# Patient Record
Sex: FEMALE | Race: ASIAN | NOT HISPANIC OR LATINO | URBAN - METROPOLITAN AREA
[De-identification: names, ages, dates, MRNs, and addresses within clinical notes are randomized per-mention and may not be internally consistent; named-entity substitution may affect disease eponyms.]

---

## 2017-07-17 ENCOUNTER — OFFICE VISIT - HEALTHEAST (OUTPATIENT)
Dept: FAMILY MEDICINE | Facility: CLINIC | Age: 37
End: 2017-07-17

## 2017-07-17 DIAGNOSIS — J02.9 SORE THROAT: ICD-10-CM

## 2017-07-20 ENCOUNTER — COMMUNICATION - HEALTHEAST (OUTPATIENT)
Dept: FAMILY MEDICINE | Facility: CLINIC | Age: 37
End: 2017-07-20

## 2017-07-27 ENCOUNTER — OFFICE VISIT - HEALTHEAST (OUTPATIENT)
Dept: SURGERY | Facility: CLINIC | Age: 37
End: 2017-07-27

## 2017-07-27 DIAGNOSIS — R16.0 LIVER MASS, RIGHT LOBE: ICD-10-CM

## 2017-07-27 DIAGNOSIS — K80.20 CALCULUS OF GALLBLADDER WITHOUT CHOLECYSTITIS WITHOUT OBSTRUCTION: ICD-10-CM

## 2017-07-27 ASSESSMENT — MIFFLIN-ST. JEOR: SCORE: 1135.92

## 2017-08-01 ENCOUNTER — AMBULATORY - HEALTHEAST (OUTPATIENT)
Dept: SURGERY | Facility: CLINIC | Age: 37
End: 2017-08-01

## 2017-08-02 ENCOUNTER — RECORDS - HEALTHEAST (OUTPATIENT)
Dept: ADMINISTRATIVE | Facility: OTHER | Age: 37
End: 2017-08-02

## 2017-08-03 ENCOUNTER — HOSPITAL ENCOUNTER (OUTPATIENT)
Dept: CT IMAGING | Facility: HOSPITAL | Age: 37
Discharge: HOME | End: 2017-08-03
Attending: SURGERY

## 2017-08-03 DIAGNOSIS — R16.0 LIVER MASS, RIGHT LOBE: ICD-10-CM

## 2017-08-09 ENCOUNTER — AMBULATORY - HEALTHEAST (OUTPATIENT)
Dept: SURGERY | Facility: CLINIC | Age: 37
End: 2017-08-09

## 2017-08-09 ENCOUNTER — RECORDS - HEALTHEAST (OUTPATIENT)
Dept: ADMINISTRATIVE | Facility: OTHER | Age: 37
End: 2017-08-09

## 2017-08-09 ENCOUNTER — COMMUNICATION - HEALTHEAST (OUTPATIENT)
Dept: SURGERY | Facility: CLINIC | Age: 37
End: 2017-08-09

## 2017-08-09 DIAGNOSIS — K80.50 BILIARY COLIC: ICD-10-CM

## 2017-08-10 ENCOUNTER — RECORDS - HEALTHEAST (OUTPATIENT)
Dept: ADMINISTRATIVE | Facility: OTHER | Age: 37
End: 2017-08-10

## 2017-08-18 ENCOUNTER — OFFICE VISIT - HEALTHEAST (OUTPATIENT)
Dept: SURGERY | Facility: CLINIC | Age: 37
End: 2017-08-18

## 2017-08-18 DIAGNOSIS — Z48.89 POSTOPERATIVE VISIT: ICD-10-CM

## 2017-09-18 ENCOUNTER — OFFICE VISIT - HEALTHEAST (OUTPATIENT)
Dept: FAMILY MEDICINE | Facility: CLINIC | Age: 37
End: 2017-09-18

## 2017-09-18 DIAGNOSIS — J35.8 SYMPTOMATIC TONSILLAR CRYPT: ICD-10-CM

## 2017-09-18 DIAGNOSIS — Z23 NEED FOR IMMUNIZATION AGAINST INFLUENZA: ICD-10-CM

## 2017-09-18 ASSESSMENT — MIFFLIN-ST. JEOR: SCORE: 1112.22

## 2017-10-11 ENCOUNTER — OFFICE VISIT - HEALTHEAST (OUTPATIENT)
Dept: OTOLARYNGOLOGY | Facility: CLINIC | Age: 37
End: 2017-10-11

## 2017-10-11 DIAGNOSIS — J35.8 TONSIL STONE: ICD-10-CM

## 2018-01-16 ENCOUNTER — OFFICE VISIT - HEALTHEAST (OUTPATIENT)
Dept: FAMILY MEDICINE | Facility: CLINIC | Age: 38
End: 2018-01-16

## 2018-01-16 DIAGNOSIS — E55.9 VITAMIN D DEFICIENCY: ICD-10-CM

## 2018-01-16 DIAGNOSIS — R79.89 LOW VITAMIN D LEVEL: ICD-10-CM

## 2018-01-16 DIAGNOSIS — D64.9 ANEMIA: ICD-10-CM

## 2018-01-16 DIAGNOSIS — K82.9 GALLBLADDER DISORDER: ICD-10-CM

## 2018-01-16 DIAGNOSIS — Z23 NEED FOR TDAP VACCINATION: ICD-10-CM

## 2018-01-16 DIAGNOSIS — J06.9 URI (UPPER RESPIRATORY INFECTION): ICD-10-CM

## 2018-01-16 LAB
ALBUMIN SERPL-MCNC: 3.7 G/DL (ref 3.5–5)
ALP SERPL-CCNC: 51 U/L (ref 45–120)
ALT SERPL W P-5'-P-CCNC: 19 U/L (ref 0–45)
ANION GAP SERPL CALCULATED.3IONS-SCNC: 9 MMOL/L (ref 5–18)
AST SERPL W P-5'-P-CCNC: 23 U/L (ref 0–40)
BASOPHILS # BLD AUTO: 0 THOU/UL (ref 0–0.2)
BASOPHILS NFR BLD AUTO: 1 % (ref 0–2)
BILIRUB SERPL-MCNC: 0.4 MG/DL (ref 0–1)
BUN SERPL-MCNC: 9 MG/DL (ref 8–22)
CALCIUM SERPL-MCNC: 9 MG/DL (ref 8.5–10.5)
CHLORIDE BLD-SCNC: 109 MMOL/L (ref 98–107)
CO2 SERPL-SCNC: 23 MMOL/L (ref 22–31)
CREAT SERPL-MCNC: 0.63 MG/DL (ref 0.6–1.1)
EOSINOPHIL # BLD AUTO: 0.1 THOU/UL (ref 0–0.4)
EOSINOPHIL NFR BLD AUTO: 2 % (ref 0–6)
ERYTHROCYTE [DISTWIDTH] IN BLOOD BY AUTOMATED COUNT: 13.7 % (ref 11–14.5)
FERRITIN SERPL-MCNC: 14 NG/ML (ref 10–130)
FLUAV AG SPEC QL IA: NORMAL
FLUBV AG SPEC QL IA: NORMAL
GFR SERPL CREATININE-BSD FRML MDRD: >60 ML/MIN/1.73M2
GLUCOSE BLD-MCNC: 78 MG/DL (ref 70–125)
HCT VFR BLD AUTO: 39 % (ref 35–47)
HGB BLD-MCNC: 12.5 G/DL (ref 12–16)
IRON SATN MFR SERPL: 11 % (ref 20–50)
IRON SERPL-MCNC: 43 UG/DL (ref 42–175)
LYMPHOCYTES # BLD AUTO: 1.6 THOU/UL (ref 0.8–4.4)
LYMPHOCYTES NFR BLD AUTO: 29 % (ref 20–40)
MCH RBC QN AUTO: 28.6 PG (ref 27–34)
MCHC RBC AUTO-ENTMCNC: 32.1 G/DL (ref 32–36)
MCV RBC AUTO: 89 FL (ref 80–100)
MONOCYTES # BLD AUTO: 0.4 THOU/UL (ref 0–0.9)
MONOCYTES NFR BLD AUTO: 8 % (ref 2–10)
NEUTROPHILS # BLD AUTO: 3.4 THOU/UL (ref 2–7.7)
NEUTROPHILS NFR BLD AUTO: 61 % (ref 50–70)
PLATELET # BLD AUTO: 177 THOU/UL (ref 140–440)
PMV BLD AUTO: 8.8 FL (ref 7–10)
POTASSIUM BLD-SCNC: 4.1 MMOL/L (ref 3.5–5)
PROT SERPL-MCNC: 7.6 G/DL (ref 6–8)
RBC # BLD AUTO: 4.37 MILL/UL (ref 3.8–5.4)
SODIUM SERPL-SCNC: 141 MMOL/L (ref 136–145)
TIBC SERPL-MCNC: 397 UG/DL (ref 313–563)
TRANSFERRIN SERPL-MCNC: 318 MG/DL (ref 212–360)
TSH SERPL DL<=0.005 MIU/L-ACNC: 0.71 UIU/ML (ref 0.3–5)
WBC: 5.5 THOU/UL (ref 4–11)

## 2018-01-17 LAB — 25(OH)D3 SERPL-MCNC: 33 NG/ML (ref 30–80)

## 2018-01-19 ENCOUNTER — COMMUNICATION - HEALTHEAST (OUTPATIENT)
Dept: FAMILY MEDICINE | Facility: CLINIC | Age: 38
End: 2018-01-19

## 2018-04-10 ENCOUNTER — OFFICE VISIT - HEALTHEAST (OUTPATIENT)
Dept: FAMILY MEDICINE | Facility: CLINIC | Age: 38
End: 2018-04-10

## 2018-04-10 DIAGNOSIS — F41.9 ANXIETY: ICD-10-CM

## 2018-04-10 DIAGNOSIS — R51.9 NONINTRACTABLE HEADACHE, UNSPECIFIED CHRONICITY PATTERN, UNSPECIFIED HEADACHE TYPE: ICD-10-CM

## 2018-04-10 LAB
ALBUMIN SERPL-MCNC: 3.5 G/DL (ref 3.5–5)
ALP SERPL-CCNC: 46 U/L (ref 45–120)
ALT SERPL W P-5'-P-CCNC: 15 U/L (ref 0–45)
ANION GAP SERPL CALCULATED.3IONS-SCNC: 8 MMOL/L (ref 5–18)
AST SERPL W P-5'-P-CCNC: 17 U/L (ref 0–40)
BASOPHILS # BLD AUTO: 0 THOU/UL (ref 0–0.2)
BASOPHILS NFR BLD AUTO: 1 % (ref 0–2)
BILIRUB SERPL-MCNC: 0.5 MG/DL (ref 0–1)
BUN SERPL-MCNC: 6 MG/DL (ref 8–22)
CALCIUM SERPL-MCNC: 8.9 MG/DL (ref 8.5–10.5)
CHLORIDE BLD-SCNC: 108 MMOL/L (ref 98–107)
CO2 SERPL-SCNC: 24 MMOL/L (ref 22–31)
CREAT SERPL-MCNC: 0.63 MG/DL (ref 0.6–1.1)
EOSINOPHIL # BLD AUTO: 0.1 THOU/UL (ref 0–0.4)
EOSINOPHIL NFR BLD AUTO: 2 % (ref 0–6)
ERYTHROCYTE [DISTWIDTH] IN BLOOD BY AUTOMATED COUNT: 14.2 % (ref 11–14.5)
GFR SERPL CREATININE-BSD FRML MDRD: >60 ML/MIN/1.73M2
GLUCOSE BLD-MCNC: 83 MG/DL (ref 70–125)
HCT VFR BLD AUTO: 37.2 % (ref 35–47)
HGB BLD-MCNC: 12.2 G/DL (ref 12–16)
LYMPHOCYTES # BLD AUTO: 1.2 THOU/UL (ref 0.8–4.4)
LYMPHOCYTES NFR BLD AUTO: 19 % (ref 20–40)
MCH RBC QN AUTO: 29.2 PG (ref 27–34)
MCHC RBC AUTO-ENTMCNC: 32.7 G/DL (ref 32–36)
MCV RBC AUTO: 89 FL (ref 80–100)
MONOCYTES # BLD AUTO: 0.5 THOU/UL (ref 0–0.9)
MONOCYTES NFR BLD AUTO: 7 % (ref 2–10)
NEUTROPHILS # BLD AUTO: 4.6 THOU/UL (ref 2–7.7)
NEUTROPHILS NFR BLD AUTO: 72 % (ref 50–70)
PLATELET # BLD AUTO: 153 THOU/UL (ref 140–440)
PMV BLD AUTO: 8.8 FL (ref 7–10)
POTASSIUM BLD-SCNC: 4.1 MMOL/L (ref 3.5–5)
PROT SERPL-MCNC: 6.9 G/DL (ref 6–8)
RBC # BLD AUTO: 4.17 MILL/UL (ref 3.8–5.4)
SODIUM SERPL-SCNC: 140 MMOL/L (ref 136–145)
TSH SERPL DL<=0.005 MIU/L-ACNC: 0.6 UIU/ML (ref 0.3–5)
WBC: 6.4 THOU/UL (ref 4–11)

## 2018-04-11 LAB — 25(OH)D3 SERPL-MCNC: 23.8 NG/ML (ref 30–80)

## 2018-04-13 ENCOUNTER — COMMUNICATION - HEALTHEAST (OUTPATIENT)
Dept: FAMILY MEDICINE | Facility: CLINIC | Age: 38
End: 2018-04-13

## 2018-04-13 ENCOUNTER — AMBULATORY - HEALTHEAST (OUTPATIENT)
Dept: FAMILY MEDICINE | Facility: CLINIC | Age: 38
End: 2018-04-13

## 2018-04-13 DIAGNOSIS — E55.9 VITAMIN D DEFICIENCY: ICD-10-CM

## 2018-04-24 ENCOUNTER — OFFICE VISIT - HEALTHEAST (OUTPATIENT)
Dept: FAMILY MEDICINE | Facility: CLINIC | Age: 38
End: 2018-04-24

## 2018-04-24 DIAGNOSIS — R21 RASH: ICD-10-CM

## 2018-05-25 ENCOUNTER — OFFICE VISIT - HEALTHEAST (OUTPATIENT)
Dept: FAMILY MEDICINE | Facility: CLINIC | Age: 38
End: 2018-05-25

## 2018-05-25 DIAGNOSIS — M72.2 PLANTAR FASCIITIS: ICD-10-CM

## 2018-05-25 DIAGNOSIS — L71.0 PERIORAL DERMATITIS: ICD-10-CM

## 2018-05-25 DIAGNOSIS — L30.9 DERMATITIS: ICD-10-CM

## 2018-05-25 DIAGNOSIS — L70.9 ACNE, UNSPECIFIED ACNE TYPE: ICD-10-CM

## 2018-05-25 DIAGNOSIS — M77.00 MEDIAL EPICONDYLITIS OF ELBOW, UNSPECIFIED LATERALITY: ICD-10-CM

## 2018-05-25 ASSESSMENT — MIFFLIN-ST. JEOR: SCORE: 1089.99

## 2018-05-28 ENCOUNTER — RECORDS - HEALTHEAST (OUTPATIENT)
Dept: ADMINISTRATIVE | Facility: OTHER | Age: 38
End: 2018-05-28

## 2018-05-28 ENCOUNTER — COMMUNICATION - HEALTHEAST (OUTPATIENT)
Dept: FAMILY MEDICINE | Facility: CLINIC | Age: 38
End: 2018-05-28

## 2018-09-07 ENCOUNTER — COMMUNICATION - HEALTHEAST (OUTPATIENT)
Dept: FAMILY MEDICINE | Facility: CLINIC | Age: 38
End: 2018-09-07

## 2018-09-07 DIAGNOSIS — E55.9 VITAMIN D DEFICIENCY: ICD-10-CM

## 2018-11-27 ENCOUNTER — RECORDS - HEALTHEAST (OUTPATIENT)
Dept: LAB | Facility: CLINIC | Age: 38
End: 2018-11-27

## 2018-11-27 LAB
ALBUMIN SERPL-MCNC: 3.9 G/DL (ref 3.5–5)
ALP SERPL-CCNC: 53 U/L (ref 45–120)
ALT SERPL W P-5'-P-CCNC: 20 U/L (ref 0–45)
ANION GAP SERPL CALCULATED.3IONS-SCNC: 12 MMOL/L (ref 5–18)
AST SERPL W P-5'-P-CCNC: 18 U/L (ref 0–40)
BASOPHILS # BLD AUTO: 0 THOU/UL (ref 0–0.2)
BASOPHILS NFR BLD AUTO: 0 % (ref 0–2)
BILIRUB SERPL-MCNC: 0.4 MG/DL (ref 0–1)
BUN SERPL-MCNC: 11 MG/DL (ref 8–22)
CALCIUM SERPL-MCNC: 9.5 MG/DL (ref 8.5–10.5)
CHLORIDE BLD-SCNC: 108 MMOL/L (ref 98–107)
CO2 SERPL-SCNC: 20 MMOL/L (ref 22–31)
CREAT SERPL-MCNC: 0.66 MG/DL (ref 0.6–1.1)
EOSINOPHIL # BLD AUTO: 0.1 THOU/UL (ref 0–0.4)
EOSINOPHIL NFR BLD AUTO: 1 % (ref 0–6)
ERYTHROCYTE [DISTWIDTH] IN BLOOD BY AUTOMATED COUNT: 13.5 % (ref 11–14.5)
ERYTHROCYTE [SEDIMENTATION RATE] IN BLOOD BY WESTERGREN METHOD: 13 MM/HR (ref 0–20)
GFR SERPL CREATININE-BSD FRML MDRD: >60 ML/MIN/1.73M2
GLUCOSE BLD-MCNC: 78 MG/DL (ref 70–125)
HCT VFR BLD AUTO: 40.9 % (ref 35–47)
HGB BLD-MCNC: 12.8 G/DL (ref 12–16)
LYMPHOCYTES # BLD AUTO: 1.8 THOU/UL (ref 0.8–4.4)
LYMPHOCYTES NFR BLD AUTO: 26 % (ref 20–40)
MCH RBC QN AUTO: 28.6 PG (ref 27–34)
MCHC RBC AUTO-ENTMCNC: 31.3 G/DL (ref 32–36)
MCV RBC AUTO: 91 FL (ref 80–100)
MONOCYTES # BLD AUTO: 0.6 THOU/UL (ref 0–0.9)
MONOCYTES NFR BLD AUTO: 9 % (ref 2–10)
NEUTROPHILS # BLD AUTO: 4.4 THOU/UL (ref 2–7.7)
NEUTROPHILS NFR BLD AUTO: 64 % (ref 50–70)
PLATELET # BLD AUTO: 237 THOU/UL (ref 140–440)
PMV BLD AUTO: 10.8 FL (ref 8.5–12.5)
POTASSIUM BLD-SCNC: 4.3 MMOL/L (ref 3.5–5)
PROT SERPL-MCNC: 7.6 G/DL (ref 6–8)
RBC # BLD AUTO: 4.48 MILL/UL (ref 3.8–5.4)
SODIUM SERPL-SCNC: 140 MMOL/L (ref 136–145)
TSH SERPL DL<=0.005 MIU/L-ACNC: 1.82 UIU/ML (ref 0.3–5)
VIT B12 SERPL-MCNC: 309 PG/ML (ref 213–816)
WBC: 6.9 THOU/UL (ref 4–11)

## 2019-01-17 ENCOUNTER — OFFICE VISIT - HEALTHEAST (OUTPATIENT)
Dept: FAMILY MEDICINE | Facility: CLINIC | Age: 39
End: 2019-01-17

## 2019-01-17 DIAGNOSIS — T78.40XS ALLERGIC REACTION, SEQUELA: ICD-10-CM

## 2019-01-25 ENCOUNTER — COMMUNICATION - HEALTHEAST (OUTPATIENT)
Dept: FAMILY MEDICINE | Facility: CLINIC | Age: 39
End: 2019-01-25

## 2020-01-03 ENCOUNTER — OFFICE VISIT - HEALTHEAST (OUTPATIENT)
Dept: FAMILY MEDICINE | Facility: CLINIC | Age: 40
End: 2020-01-03

## 2020-01-03 DIAGNOSIS — K29.00 ACUTE GASTRITIS WITHOUT HEMORRHAGE, UNSPECIFIED GASTRITIS TYPE: ICD-10-CM

## 2020-01-03 DIAGNOSIS — K59.00 CONSTIPATION, UNSPECIFIED CONSTIPATION TYPE: ICD-10-CM

## 2020-01-03 DIAGNOSIS — R10.13 EPIGASTRIC PAIN: ICD-10-CM

## 2020-01-31 ENCOUNTER — OFFICE VISIT - HEALTHEAST (OUTPATIENT)
Dept: FAMILY MEDICINE | Facility: CLINIC | Age: 40
End: 2020-01-31

## 2020-01-31 DIAGNOSIS — E53.8 VITAMIN B12 DEFICIENCY (NON ANEMIC): ICD-10-CM

## 2020-01-31 DIAGNOSIS — R10.13 DYSPEPSIA: ICD-10-CM

## 2020-01-31 DIAGNOSIS — E55.9 VITAMIN D DEFICIENCY: ICD-10-CM

## 2020-01-31 DIAGNOSIS — Z13.220 SCREENING FOR CHOLESTEROL LEVEL: ICD-10-CM

## 2020-01-31 DIAGNOSIS — R10.13 EPIGASTRIC PAIN: ICD-10-CM

## 2020-01-31 DIAGNOSIS — L30.9 DERMATITIS: ICD-10-CM

## 2020-01-31 DIAGNOSIS — Z13.29 SCREENING FOR THYROID DISORDER: ICD-10-CM

## 2020-01-31 LAB
CRP SERPL HS-MCNC: 3.5 MG/L (ref 0–3)
TSH SERPL DL<=0.005 MIU/L-ACNC: 1.08 UIU/ML (ref 0.3–5)
VIT B12 SERPL-MCNC: 338 PG/ML (ref 213–816)

## 2020-01-31 RX ORDER — SUCRALFATE 1 G/1
1 TABLET ORAL 4 TIMES DAILY
Qty: 120 TABLET | Refills: 0 | Status: SHIPPED | OUTPATIENT
Start: 2020-01-31 | End: 2021-12-26

## 2020-01-31 ASSESSMENT — MIFFLIN-ST. JEOR: SCORE: 1137.34

## 2020-02-03 LAB — 25(OH)D3 SERPL-MCNC: 19.6 NG/ML (ref 30–80)

## 2020-02-04 LAB
CHOLEST SERPL-MCNC: 164 MG/DL
HDL SERPL QN: 9 NM
HDL SERPL-SCNC: 28.8 UMOL/L
HDLC SERPL-MCNC: 48 MG/DL (ref 40–59)
HLD.LARGE SERPL-SCNC: 5.3 UMOL/L
LDL SERPL QN: 20.9 NM
LDL SERPL-SCNC: 1167 NMOL/L
LDL SMALL SERPL-SCNC: 528 NMOL/L
LDLC SERPL CALC-MCNC: 99 MG/DL
PATHOLOGY STUDY: ABNORMAL
THYROID PEROXIDASE ANTIBODIES - HISTORICAL: <3 IU/ML (ref 0–5.6)
TRIGL SERPL-MCNC: 87 MG/DL (ref 30–149)
VLDL LARGE SERPL-SCNC: 2.8 NMOL/L
VLDL SERPL QN: 46.9 NM

## 2020-02-12 ENCOUNTER — COMMUNICATION - HEALTHEAST (OUTPATIENT)
Dept: FAMILY MEDICINE | Facility: CLINIC | Age: 40
End: 2020-02-12

## 2020-06-19 ENCOUNTER — AMBULATORY - HEALTHEAST (OUTPATIENT)
Dept: FAMILY MEDICINE | Facility: CLINIC | Age: 40
End: 2020-06-19

## 2020-06-19 ENCOUNTER — OFFICE VISIT - HEALTHEAST (OUTPATIENT)
Dept: FAMILY MEDICINE | Facility: CLINIC | Age: 40
End: 2020-06-19

## 2020-06-19 DIAGNOSIS — J02.0 STREPTOCOCCAL SORE THROAT: ICD-10-CM

## 2020-06-19 DIAGNOSIS — R07.0 THROAT PAIN: ICD-10-CM

## 2020-06-19 LAB — DEPRECATED S PYO AG THROAT QL EIA: ABNORMAL

## 2020-06-20 ENCOUNTER — COMMUNICATION - HEALTHEAST (OUTPATIENT)
Dept: FAMILY MEDICINE | Facility: CLINIC | Age: 40
End: 2020-06-20

## 2020-12-22 ENCOUNTER — OFFICE VISIT - HEALTHEAST (OUTPATIENT)
Dept: FAMILY MEDICINE | Facility: CLINIC | Age: 40
End: 2020-12-22

## 2020-12-22 DIAGNOSIS — L50.9 HIVES: ICD-10-CM

## 2020-12-22 RX ORDER — DIPHENHYDRAMINE HCL 25 MG
50 CAPSULE ORAL EVERY 4 HOURS PRN
Qty: 90 CAPSULE | Refills: 2 | Status: SHIPPED | OUTPATIENT
Start: 2020-12-22 | End: 2021-12-26

## 2020-12-22 RX ORDER — CETIRIZINE HYDROCHLORIDE 10 MG/1
10 TABLET ORAL DAILY
Qty: 30 TABLET | Refills: 3 | Status: SHIPPED | OUTPATIENT
Start: 2020-12-22 | End: 2021-12-26

## 2021-01-08 ENCOUNTER — OFFICE VISIT - HEALTHEAST (OUTPATIENT)
Dept: FAMILY MEDICINE | Facility: CLINIC | Age: 41
End: 2021-01-08

## 2021-01-08 ENCOUNTER — COMMUNICATION - HEALTHEAST (OUTPATIENT)
Dept: SCHEDULING | Facility: CLINIC | Age: 41
End: 2021-01-08

## 2021-01-08 DIAGNOSIS — E04.1 THYROID NODULE: ICD-10-CM

## 2021-01-08 DIAGNOSIS — R10.13 DYSPEPSIA: ICD-10-CM

## 2021-01-08 DIAGNOSIS — L50.9 HIVES: ICD-10-CM

## 2021-01-08 DIAGNOSIS — K59.00 CONSTIPATION, UNSPECIFIED CONSTIPATION TYPE: ICD-10-CM

## 2021-01-08 LAB
ALBUMIN SERPL-MCNC: 3.7 G/DL (ref 3.5–5)
ALP SERPL-CCNC: 51 U/L (ref 45–120)
ALT SERPL W P-5'-P-CCNC: 17 U/L (ref 0–45)
ANION GAP SERPL CALCULATED.3IONS-SCNC: 10 MMOL/L (ref 5–18)
AST SERPL W P-5'-P-CCNC: 16 U/L (ref 0–40)
BASOPHILS # BLD AUTO: 0 THOU/UL (ref 0–0.2)
BASOPHILS NFR BLD AUTO: 1 % (ref 0–2)
BILIRUB SERPL-MCNC: 0.6 MG/DL (ref 0–1)
BUN SERPL-MCNC: 8 MG/DL (ref 8–22)
CALCIUM SERPL-MCNC: 9.4 MG/DL (ref 8.5–10.5)
CHLORIDE BLD-SCNC: 107 MMOL/L (ref 98–107)
CO2 SERPL-SCNC: 24 MMOL/L (ref 22–31)
CREAT SERPL-MCNC: 0.8 MG/DL (ref 0.6–1.1)
EOSINOPHIL # BLD AUTO: 0.2 THOU/UL (ref 0–0.4)
EOSINOPHIL NFR BLD AUTO: 2 % (ref 0–6)
ERYTHROCYTE [DISTWIDTH] IN BLOOD BY AUTOMATED COUNT: 13.6 % (ref 11–14.5)
GFR SERPL CREATININE-BSD FRML MDRD: >60 ML/MIN/1.73M2
GLUCOSE BLD-MCNC: 68 MG/DL (ref 70–125)
HCT VFR BLD AUTO: 40.4 % (ref 35–47)
HGB BLD-MCNC: 13.4 G/DL (ref 12–16)
LYMPHOCYTES # BLD AUTO: 1.8 THOU/UL (ref 0.8–4.4)
LYMPHOCYTES NFR BLD AUTO: 28 % (ref 20–40)
MCH RBC QN AUTO: 28.2 PG (ref 27–34)
MCHC RBC AUTO-ENTMCNC: 33.3 G/DL (ref 32–36)
MCV RBC AUTO: 85 FL (ref 80–100)
MONOCYTES # BLD AUTO: 0.6 THOU/UL (ref 0–0.9)
MONOCYTES NFR BLD AUTO: 8 % (ref 2–10)
NEUTROPHILS # BLD AUTO: 4 THOU/UL (ref 2–7.7)
NEUTROPHILS NFR BLD AUTO: 61 % (ref 50–70)
PLATELET # BLD AUTO: 247 THOU/UL (ref 140–440)
PMV BLD AUTO: 7.7 FL (ref 7–10)
POTASSIUM BLD-SCNC: 3.8 MMOL/L (ref 3.5–5)
PROT SERPL-MCNC: 7.6 G/DL (ref 6–8)
RBC # BLD AUTO: 4.76 MILL/UL (ref 3.8–5.4)
SODIUM SERPL-SCNC: 141 MMOL/L (ref 136–145)
TSH SERPL DL<=0.005 MIU/L-ACNC: 0.94 UIU/ML (ref 0.3–5)
WBC: 6.5 THOU/UL (ref 4–11)

## 2021-01-08 RX ORDER — DOCUSATE SODIUM 100 MG/1
100 CAPSULE, LIQUID FILLED ORAL 2 TIMES DAILY PRN
Qty: 60 CAPSULE | Refills: 5 | Status: SHIPPED | OUTPATIENT
Start: 2021-01-08 | End: 2021-12-26

## 2021-01-08 RX ORDER — CETIRIZINE HYDROCHLORIDE 10 MG/1
10 TABLET ORAL DAILY
Qty: 30 TABLET | Refills: 3 | Status: SHIPPED | OUTPATIENT
Start: 2021-01-08 | End: 2021-12-26

## 2021-01-15 ENCOUNTER — HOSPITAL ENCOUNTER (OUTPATIENT)
Dept: ULTRASOUND IMAGING | Facility: HOSPITAL | Age: 41
Discharge: HOME | End: 2021-01-15
Attending: FAMILY MEDICINE

## 2021-01-15 DIAGNOSIS — E04.1 THYROID NODULE: ICD-10-CM

## 2021-01-16 ENCOUNTER — AMBULATORY - HEALTHEAST (OUTPATIENT)
Dept: FAMILY MEDICINE | Facility: CLINIC | Age: 41
End: 2021-01-16

## 2021-01-16 ENCOUNTER — COMMUNICATION - HEALTHEAST (OUTPATIENT)
Dept: FAMILY MEDICINE | Facility: CLINIC | Age: 41
End: 2021-01-16

## 2021-01-16 DIAGNOSIS — E04.1 THYROID NODULE: ICD-10-CM

## 2021-01-20 ENCOUNTER — COMMUNICATION - HEALTHEAST (OUTPATIENT)
Dept: INTERNAL MEDICINE | Facility: CLINIC | Age: 41
End: 2021-01-20

## 2021-02-15 ENCOUNTER — COMMUNICATION - HEALTHEAST (OUTPATIENT)
Dept: FAMILY MEDICINE | Facility: CLINIC | Age: 41
End: 2021-02-15

## 2021-02-15 DIAGNOSIS — R10.13 DYSPEPSIA: ICD-10-CM

## 2021-02-16 RX ORDER — OMEPRAZOLE 40 MG/1
CAPSULE, DELAYED RELEASE ORAL
Qty: 30 CAPSULE | Refills: 0 | Status: SHIPPED | OUTPATIENT
Start: 2021-02-16 | End: 2021-12-26

## 2021-04-26 ENCOUNTER — COMMUNICATION - HEALTHEAST (OUTPATIENT)
Dept: INTERNAL MEDICINE | Facility: CLINIC | Age: 41
End: 2021-04-26

## 2021-04-26 DIAGNOSIS — L50.9 HIVES: ICD-10-CM

## 2021-04-27 RX ORDER — MONTELUKAST SODIUM 10 MG/1
TABLET ORAL
Qty: 30 TABLET | Refills: 2 | Status: SHIPPED | OUTPATIENT
Start: 2021-04-27 | End: 2021-12-26

## 2021-05-26 VITALS
RESPIRATION RATE: 18 BRPM | OXYGEN SATURATION: 98 % | DIASTOLIC BLOOD PRESSURE: 79 MMHG | HEART RATE: 67 BPM | SYSTOLIC BLOOD PRESSURE: 118 MMHG | TEMPERATURE: 98.2 F

## 2021-05-27 VITALS — OXYGEN SATURATION: 99 % | SYSTOLIC BLOOD PRESSURE: 110 MMHG | DIASTOLIC BLOOD PRESSURE: 68 MMHG | HEART RATE: 78 BPM

## 2021-05-29 ENCOUNTER — RECORDS - HEALTHEAST (OUTPATIENT)
Dept: ADMINISTRATIVE | Facility: CLINIC | Age: 41
End: 2021-05-29

## 2021-05-30 ENCOUNTER — RECORDS - HEALTHEAST (OUTPATIENT)
Dept: ADMINISTRATIVE | Facility: CLINIC | Age: 41
End: 2021-05-30

## 2021-05-31 VITALS — BODY MASS INDEX: 25.81 KG/M2 | WEIGHT: 123.5 LBS

## 2021-05-31 VITALS — WEIGHT: 127.25 LBS | BODY MASS INDEX: 26.71 KG/M2 | HEIGHT: 58 IN

## 2021-05-31 VITALS — BODY MASS INDEX: 26.21 KG/M2 | WEIGHT: 126.5 LBS

## 2021-05-31 VITALS — WEIGHT: 122.9 LBS | HEIGHT: 58 IN | BODY MASS INDEX: 25.8 KG/M2

## 2021-06-01 VITALS — WEIGHT: 122.5 LBS | BODY MASS INDEX: 25.6 KG/M2

## 2021-06-01 VITALS — HEIGHT: 58 IN | BODY MASS INDEX: 24.77 KG/M2 | WEIGHT: 118 LBS

## 2021-06-01 VITALS — BODY MASS INDEX: 25.08 KG/M2 | WEIGHT: 120 LBS

## 2021-06-02 ENCOUNTER — RECORDS - HEALTHEAST (OUTPATIENT)
Dept: ADMINISTRATIVE | Facility: CLINIC | Age: 41
End: 2021-06-02

## 2021-06-02 VITALS — WEIGHT: 125 LBS | BODY MASS INDEX: 26.13 KG/M2

## 2021-06-03 VITALS
WEIGHT: 128 LBS | OXYGEN SATURATION: 99 % | BODY MASS INDEX: 27.7 KG/M2 | DIASTOLIC BLOOD PRESSURE: 64 MMHG | SYSTOLIC BLOOD PRESSURE: 104 MMHG | HEART RATE: 73 BPM

## 2021-06-04 VITALS
HEIGHT: 58 IN | BODY MASS INDEX: 26.76 KG/M2 | WEIGHT: 127.5 LBS | DIASTOLIC BLOOD PRESSURE: 64 MMHG | HEART RATE: 60 BPM | SYSTOLIC BLOOD PRESSURE: 96 MMHG

## 2021-06-04 NOTE — PATIENT INSTRUCTIONS - HE
Take the carafate 4x a day as needed for next 4 weeks then try stopping.    If pain returns, notify Robert Chandra MD and will schedule upper scope exam.    Continue with the other medicines to keep bowels regular.

## 2021-06-04 NOTE — PROGRESS NOTES
SUBJECTIVE: Quique Sanford is a 39 y.o. female with:  Chief Complaint   Patient presents with     Follow-up     abdominal pain,  and     She was seen in the ER for upper abdominal pain 2x last week.  Had xray that showed constipation.  Lab work was normal.  She was given omeprazole initially but did not seem to help.  She did not feel any better so went to ER a second time.  She was given Miralax/ Colace / sucralfate.  Started taking sucralfate and pain goes away.  Pain comes back when she is hungry.  Initially pain was constant - epigastric area/ burning / twisting pain.  Now she has a little burning pain when she gets hungry.  No nausea / vomiting / heartburn.  Had some bloating initially.  Having bowel movement daily.  Stool mostly soft - occasionally hard.  No tarry, black stools or blood in stools.    She switched her rice brand before symptoms.  At work she drinks dark green tea.  No alcohol.  Takes Tylenol. No NSAIDS.  No stress.  She returned from [place] in 2019. S/P choly.      Patient Active Problem List   Diagnosis      delivery delivered     Anemia     Common Migraine (Without Aura)     Vitamin D Deficiency      (vaginal birth after )        OBJECTIVE: /64 (Patient Site: Right Arm, Patient Position: Sitting, Cuff Size: Adult Regular)   Pulse 73   Wt 128 lb (58.1 kg)   LMP 2019   SpO2 99%   BMI 27.70 kg/m   no distress  Lungs: Clear to auscultation.  No retractions or tachypnea.  CV: RRR. S1 and S2 normal.  No murmurs, rubs or gallops.  Abdomen: Soft. NT. ND. No HSM or masses.    Quique was seen today for follow-up.    Diagnoses and all orders for this visit:    Acute gastritis without hemorrhage, unspecified gastritis type    Epigastric pain  -     sucralfate (CARAFATE) 1 gram tablet; Take 1 tablet (1 g total) by mouth 4 (four) times a day.    Constipation, unspecified constipation type  -     docusate sodium (COLACE) 100 MG capsule; Take 1 capsule (100 mg  total) by mouth 2 (two) times a day as needed for constipation.       Patient Instructions   Take the carafate 4x a day as needed for next 4 weeks then try stopping.    If pain returns, notify Robert Chandra MD and will schedule upper scope exam.    Continue with the other medicines to keep bowels regular.     She is improved.  If symtoms reoccur, recommend EGD/ testing for H pylori.  Has f/u with Robert Chandra MD in a few weeks.    Luz Jimenez

## 2021-06-05 VITALS
WEIGHT: 131 LBS | HEART RATE: 78 BPM | RESPIRATION RATE: 16 BRPM | DIASTOLIC BLOOD PRESSURE: 70 MMHG | SYSTOLIC BLOOD PRESSURE: 102 MMHG | TEMPERATURE: 98.1 F | BODY MASS INDEX: 27.13 KG/M2

## 2021-06-05 NOTE — PROGRESS NOTES
ASSESSMENT & PLAN    Could be dyspepsia  Continue omeprazole and transition to lower dose  Carafate for 1 more month  Neck step would be endoscopy    Think about intestinal obstruction history of cholecystectomy  Think about choledocholithiasis although labs point away from this    Screening cholesterol  Screening thyroid  History of low vitamin D check  History of low B12 check  For inflammatory component  No problem-specific Assessment & Plan notes found for this encounter.      Quique was seen today for follow-up.    Diagnoses and all orders for this visit:    Dyspepsia  -     omeprazole (PRILOSEC) 40 MG capsule; 1 daily for 30 days then transition to 20 mg Capsule    Epigastric pain  -     sucralfate (CARAFATE) 1 gram tablet; Take 1 tablet (1 g total) by mouth 4 (four) times a day. For 30 days more  -     H. pylori Antigen, Stool(HPSAG)    Vitamin D deficiency  -     Vitamin D, Total (25-Hydroxy)    Dermatitis  -     C -Reactive Protein, High Sensitivity    Screening for cholesterol level  -     LipoFit by NMR  -     C -Reactive Protein, High Sensitivity    Vitamin B12 deficiency (non anemic)  -     Vitamin B12    Screening for thyroid disorder  -     Thyroid Cascade  -     Thyroid Peroxidase Antibody        Patient Instructions   Avoid Aspirin / Ibuprofen and Aleve    Tylenol / Acetaminophen is OK    Stool Test H.Pylori  Check this we will give you home test for stool  Continue   Omeprazole 40 mg do larger capsule 1 a day for 30 days then  Switch to AM only 1 daily 20 mg daily    Sucralfate or Carafate 1 g 4 times a day for total of 30 more days     do more fiber lots of greens    If you have any sense that you are having worsening symptoms the next up would be endoscopy      Return in about 6 months (around 7/31/2020).       Little interest or pleasure in doing things: Not at all  Feeling down, depressed, or hopeless: Not at all    CHIEF COMPLAINT: Quique Sanford had concerns including Follow-up (Seen in ED on  12/2819. Abdominal pain. ).    [place]: [place].............. had concerns including Follow-up (Seen in ED on 12/2819. Abdominal pain. ).    1. Dyspepsia    2. Epigastric pain    3. Vitamin D deficiency    4. Dermatitis    5. Screening for cholesterol level    6. Vitamin B12 deficiency (non anemic)    7. Screening for thyroid disorder          CC:             Why are you here today?                              In the emergency room twice        Is it getting better / [id number] and [id number]  Labs are basically normal  X-ray revealed constipation  Stools are moving better she had MiraLAX she felt this was not helpful    Omeprazole and Carafate have been helpful    Denies any history of specific food triggers    Denies any dark stools nausea vomiting      Emergency room issue waxing and waning abdominal pain right upper quadrant history of a cholecystectomy at that time nausea and diarrhea basically is resolved  se /same ?                    Happened before?  No            SUBJECTIVE:  Quique Sanford is a 39 y.o. female                                SOCIAL: She  reports that she has never smoked. She has never used smokeless tobacco. She reports that she does not drink alcohol or use drugs.    REVIEW OF SYSTEMS:   Family history not pertinent to chief complaint or presenting problem    Review of Systems:      Nervous System:  No new or change in headache, paresthesia or tremor                                  Ears: No new hearing loss or ringing in the ears    Eyes: No new blurring of vision, Double Vision                Nose: No new nosebleed or loss of smell    Mouth: No new mouth sores or  coated tongue    Throat: No new hoarseness or difficulty swallowing    Neck: No new neck pain or mass    Heart: No new chest pain, palpitation or irregular heartbeat.                  Lungs: No new shortness of breath, wheezing or hemoptysis.    Gastrointestinal: No new nausea or vomiting, melena or blood in stools.    Kidney/Bladder: No new  "polyuria, polydipsia, or hematuria.                             Genital/Sexual: No new Sex function Changes                                Skin: No new rash    Muscles/Joints/Bones: No changes in muscles / joint swelling     Review of systems otherwise negative as requested from patient, except   Those positive ROS outlined and discussed in [place].      VITALS:      Physical Exam:  Sclera clear  Thyroid prep nontender without nodules  Oropharynx clear  Lungs are clear  Cardiac no murmur  Abdomen soft nontender with positive bowel sounds no appreciable organ large no rebound or guarding  Palpable femoral pulses calves are supple no appreciable dermatitis at the present time        Vitals:    01/31/20 1104   BP: 96/64   Patient Site: Left Arm   Patient Position: Sitting   Cuff Size: Adult Regular   Pulse: 60   Weight: 127 lb 8 oz (57.8 kg)   Height: 4' 10.27\" (1.48 m)     Wt Readings from Last 3 Encounters:   01/31/20 127 lb 8 oz (57.8 kg)   01/03/20 128 lb (58.1 kg)   12/24/19 130 lb (59 kg)     Body mass index is 26.4 kg/m .    PFSH:    Social History     Tobacco Use   Smoking Status Never Smoker   Smokeless Tobacco Never Used       Family History   Problem Relation Age of Onset     No Medical Problems Mother      No Medical Problems Father        Social History     Socioeconomic History     Marital status: Single     Spouse name: Not on file     Number of children: Not on file     Years of education: Not on file     Highest education level: Not on file   Occupational History     Not on file   Social Needs     Financial resource strain: Not on file     Food insecurity:     Worry: Not on file     Inability: Not on file     Transportation needs:     Medical: Not on file     Non-medical: Not on file   Tobacco Use     Smoking status: Never Smoker     Smokeless tobacco: Never Used   Substance and Sexual Activity     Alcohol use: No     Drug use: No     Sexual activity: Yes     Partners: Male     Birth control/protection: " None   Lifestyle     Physical activity:     Days per week: Not on file     Minutes per session: Not on file     Stress: Not on file   Relationships     Social connections:     Talks on phone: Not on file     Gets together: Not on file     Attends Rastafari service: Not on file     Active member of club or organization: Not on file     Attends meetings of clubs or organizations: Not on file     Relationship status: Not on file     Intimate partner violence:     Fear of current or ex partner: Not on file     Emotionally abused: Not on file     Physically abused: Not on file     Forced sexual activity: Not on file   Other Topics Concern     Not on file   Social History Narrative     Not on file       Past Surgical History:   Procedure Laterality Date      SECTION       CHOLECYSTECTOMY         No Known Allergies    Active Ambulatory Problems     Diagnosis Date Noted      delivery delivered      Anemia      Common Migraine (Without Aura)      Vitamin D Deficiency       (vaginal birth after ) 07/15/2014     Resolved Ambulatory Problems     Diagnosis Date Noted     Pregnancy 07/15/2014     Past Medical History:   Diagnosis Date     Cholelithiasis      Migraine          MEDICATIONS:  Current Outpatient Medications   Medication Sig Dispense Refill     docusate sodium (COLACE) 100 MG capsule Take 1 capsule (100 mg total) by mouth 2 (two) times a day as needed for constipation. 60 capsule 5     omeprazole (PRILOSEC) 40 MG capsule 1 daily for 30 days then transition to 20 mg Capsule 30 capsule 0     polyethylene glycol (GLYCOLAX) 17 gram/dose powder Take 17 g by mouth daily as needed (constipation). 235 g 0     sucralfate (CARAFATE) 1 gram tablet Take 1 tablet (1 g total) by mouth 4 (four) times a day. For 30 days more 120 tablet 0     cetirizine (ZYRTEC) 10 MG tablet Take 1 tablet (10 mg total) by mouth daily. As needed for allergy reaction 30 tablet 3     No current facility-administered  medications for this visit.               I spent 25 minutes with this patient face to face, of which 50% or greater was spent in counseling and coordination of care with regards to Youa was seen today for follow-up.    Diagnoses and all orders for this visit:    Dyspepsia  -     omeprazole (PRILOSEC) 40 MG capsule; 1 daily for 30 days then transition to 20 mg Capsule    Epigastric pain  -     sucralfate (CARAFATE) 1 gram tablet; Take 1 tablet (1 g total) by mouth 4 (four) times a day. For 30 days more  -     H. pylori Antigen, Stool(HPSAG)    Vitamin D deficiency  -     Vitamin D, Total (25-Hydroxy)    Dermatitis  -     C -Reactive Protein, High Sensitivity    Screening for cholesterol level  -     LipoFit by NMR  -     C -Reactive Protein, High Sensitivity    Vitamin B12 deficiency (non anemic)  -     Vitamin B12    Screening for thyroid disorder  -     Thyroid Cascade  -     Thyroid Peroxidase Antibody        Robert Chandra MD  Family Medicine   [facility] [phone]

## 2021-06-05 NOTE — PATIENT INSTRUCTIONS - HE
Avoid Aspirin / Ibuprofen and Aleve    Tylenol / Acetaminophen is OK    Stool Test H.Pylori  Check this we will give you home test for stool  Continue   Omeprazole 40 mg do larger capsule 1 a day for 30 days then  Switch to AM only 1 daily 20 mg daily    Sucralfate or Carafate 1 g 4 times a day for total of 30 more days     do more fiber lots of greens    If you have any sense that you are having worsening symptoms the next up would be endoscopy

## 2021-06-11 NOTE — PROGRESS NOTES
CHIEF COMPLAINT: Quique Sanford had concerns including Sore Throat.    [place]: 1.............. had concerns including Sore Throat.    1. Sore throat      No problem-specific Assessment & Plan notes found for this encounter.      CC:              Sore throat     What's it like:         Pt states swollen tonsils, blisters located back of throat.   How long is it ongoing:      Ongoing since May  What makes it worse :     Pt states constant pain nothing seems to make it worst     What makes it better:    Better with tylenol and sleeping   0/:Pain/Intesity     6      Associated Sx:   Pt states sometimes pain radiates to ears.         SUBJECTIVE:  Quique Sanford is a 37 y.o. female    Past Medical History:   Diagnosis Date     Anemia      Migraine      Vitamin D deficiency      Past Surgical History:   Procedure Laterality Date      SECTION       Review of patient's allergies indicates no known allergies.  No current outpatient prescriptions on file.     No current facility-administered medications for this visit.      Family History   Problem Relation Age of Onset     No Medical Problems Mother      No Medical Problems Father      Social History     Social History     Marital status: Single     Spouse name: N/A     Number of children: N/A     Years of education: N/A     Social History Main Topics     Smoking status: Never Smoker     Smokeless tobacco: Never Used     Alcohol use No     Drug use: No     Sexual activity: Yes     Partners: Male     Birth control/ protection: None     Other Topics Concern     None     Social History Narrative     Patient Active Problem List   Diagnosis      Delivery     Anemia     Common Migraine (Without Aura)     Vitamin D Deficiency     Pregnancy      (vaginal birth after )                                              SOCIAL: She  reports that she has never smoked. She has never used smokeless tobacco. She reports that she does not drink alcohol or use illicit  drugs.    REVIEW OF SYSTEMS:     FAMILY HISTORY NOT PERTINENT TO CHIEF COMPLAINT OR PRESENTING PROBLEM  Review of systems otherwise negative as requested from patient, except   Positive ROS outlined and discussed in [place].    OBJECTIVE:  /64 (Patient Site: Left Arm, Patient Position: Sitting, Cuff Size: Adult Regular)  Pulse 71  Temp 97.9  F (36.6  C) (Oral)   Resp 16  Wt 126 lb 8 oz (57.4 kg)  SpO2 98%  Breastfeeding? No  BMI 26.21 kg/m2    GENERAL:     No acute distress.   Alert and oriented X 3         Physical:    TMs are clear nasal mucosa is clear neck is supple no cervical or subclavicular nodes tonsillar stone visualized in the left and the right tiny 2 mm small pocket of pustular area slight injection of the soft palate throat culture taken rapid strep negative  Lungs are clear cardiac no murmur regular rate and rhythm  Mild TMJ J tenderness        ASSESSMENT & PLAN      Quique was seen today for sore throat.    Diagnoses and all orders for this visit:    Sore throat  -     Rapid Strep A Screen-Throat  -     Group A Strep, RNA Direct Detection, Throat  -     Culture, Throat      Return if symptoms worsen or fail to improve.       Salt water gargles  Coconut oil  Probitic  Wait for culture      Anticipatory Guidance and Symptomatic Cares Discussed   Advised to call back directly if there are further questions, or if these symptoms fail to improve as anticipated or worsen.  Return to clinic if patient has a clinical concern that warrants an exam.        20  Min Total Time, > 50% counseling and coordination of Care    Robert Chandra MD  Family Medicine   [facility] [phone]

## 2021-06-12 NOTE — PROGRESS NOTES
Quique is scheduled for laparoscopic cholecystectomy with Dr. Wilson on 8/9/17 at [facility]. Patient was given instructions of arrival time, need a , and NPO after midnight. Patient verbalized understanding.     Pre-op by Dr. Steve Ba, [facility]  Physician    [facility]   [phone]

## 2021-06-12 NOTE — PROGRESS NOTES
HPI: Quique Sanford is a 37 y.o. female referred to see me by Robert Chandra MD for right upper quadrant pain.  She notes the onset of right upper quadrant and epigastric pain 5 days ago, which prompted her to present to the emergency department for evaluation.  She denies having any associated nausea or vomiting, fevers or chills.  Denies any exacerbating events such as eating or physical activity.  When she is in the emergency department, the pain gradually improved and she was ultimately discharged home.  Since returning home, she has had one additional episode of pain which was described as retrosternal, burning sensation.     She is only had one previous episode of the epigastric/right upper quadrant pain, occurring 4 years ago.  Denies any nausea, vomiting, fevers, chills, juandice or any other symptoms at present.       An incidental finding on her evaluation in the emergency department, the presence of multiple hypoechoic lesions in her liver.  Has no prior history of malignancy, nor any family history of malignancy.    Allergies:Review of patient's allergies indicates no known allergies.    Past Medical History:   Diagnosis Date     Anemia      Cholelithiasis      Migraine      Vitamin D deficiency        Past Surgical History:   Procedure Laterality Date      SECTION         CURRENT MEDS:    Current Outpatient Prescriptions:      ibuprofen (ADVIL,MOTRIN) 600 MG tablet, TAKE 1 TABLET BY MOUTH TWICE DAILY AS NEEDED // IB ZAUG NOJ 1 LUB, IB HNUB NOJ 2 ZAALVARO YOG MOB, Disp: , Rfl: 0    Family History   Problem Relation Age of Onset     No Medical Problems Mother      No Medical Problems Father         reports that she has never smoked. She has never used smokeless tobacco. She reports that she does not drink alcohol or use illicit drugs.    Review of Systems:  The 10 point review of systems  is within normal limits except for as mentioned above in the HPI.  General ROS: No complaints or constitutional  "symptoms  Skin: No complaints or symptoms   Hematologic/Lymphatic: No symptoms or complaints  Psychiatric: No symptoms or complaints  Endocrine: No excessive fatigue, no hypermetabolic symptoms reported  Respiratory ROS: no cough, shortness of breath, or wheezing  Cardiovascular ROS: no chest pain or dyspnea on exertion  Gastrointestinal ROS: As per HPI  Musculoskeletal ROS: no recent injuries reported  Neurological ROS: no focal neurologic defects reported.        /56 (Patient Site: Right Arm, Patient Position: Sitting, Cuff Size: Adult Regular)  Pulse 62  Ht 4' 10.25\" (1.48 m)  Wt 127 lb 4 oz (57.7 kg)  LMP  (Within Weeks)  SpO2 98%  Breastfeeding? No  BMI 26.37 kg/m2  Body mass index is 26.37 kg/(m^2).    EXAM:  General : Alert, cooperative, appears stated age   Skin: Skin color, texture, turgor normal, no rashes or lesions   Lymphatic: No obvious adenopathy, no swelling   Eyes: No scleral icterus, pupils equal  HENT: no traumatic injury to the head or face, no gross abnormalities  Lungs: Normal respiratory effort, breath sounds equal bilaterally  Heart: Regular rate and rhythm  Abdomen: Soft, nondistended, nontender  Musculoskeletal: No obvious swelling  Neurologic: Grossly intact      LABS:  Lab Results   Component Value Date    WBC 5.9 07/22/2017    HGB 12.8 07/22/2017    HCT 39.0 07/22/2017    MCV 89 07/22/2017     07/22/2017     INR/Prothrombin Time    Results from last 7 days  Lab Units 07/22/17  0919   LN-SODIUM mmol/L 137   LN-POTASSIUM mmol/L 3.4*   LN-CHLORIDE mmol/L 107   LN-CO2 mmol/L 22   LN-BLOOD UREA NITROGEN mg/dL 13   LN-CREATININE mg/dL 0.69   LN-CALCIUM mg/dL 8.8     Lab Results   Component Value Date    ALT 12 07/22/2017    AST 15 07/22/2017    ALKPHOS 46 07/22/2017    BILITOT 0.4 07/22/2017       IMAGES:   Relevant images were reviewed and discussed with the patient.  Notable findings were: The presence of a moderate sized gallstone within a normal-appearing gallbladder.  " 3 hypoechoic lesions were present in the liver, the largest of these in the right lobe.    Assessment/Plan:   1. Liver mass, right lobe    2. Calculus of gallbladder without cholecystitis without obstruction        Quique Sanford is a 37 y.o. female with signs and symptoms consistent with biliary colic, with incidental finding of hypoechoic liver lesions on ultrasound.  I have explained the pathophysiology of gallstone formation and progression to biliary colic in detail as well as the surgical versus non-operative management strategies.      The risks of surgery were discussed in detail which include, but are not limited to, bleeding, infection, injury to surrounding structures, the need to convert to an open procedure, blood clots, stroke, heart attack and death.  Specifically we discussed the risk of damage to the common bile duct and hepatic vessels, and the complications that may arise from damage to these structures.  Additionally, the risks of non operative management were discussed which include, but are not limited to, worsening infection, increased pain, sepsis and death.     She understands everything which was discussed and has consented to proceed with a laparoscopic cholecystectomy.      With regard to the incidentally it noted liver lesions, I explained that these could be anything from benign hepatic adenomas, something more sinister such as an underlying malignancy.  The ultrasound was unfortunately none diagnostic in this regard.  We will therefore set her up for a triple phase CT of the liver to better characterize these lesions.  Will arrange for her to undergo CT imaging of the liver prior to her cholecystectomy in case CT imaging findings were to change our operative plan and approach. we will schedule surgery accordingly.       Beni Wilson M.D.   [phone]  [facility] Department of Surgery

## 2021-06-12 NOTE — PROGRESS NOTES
HPI: Pt is here for follow up of a lap fanny.   she is doing well.  Pain is well controlled.  No difficulties with the surgical wound/wounds.  she is eating well and denies fever and chills.   Does complain of mild rash. Itching only at skin around neck. No dyspnea, cough. Tender right back and upper abdomin with movmement  Interpretor used    BP 95/61 (Patient Site: Right Arm, Patient Position: Sitting, Cuff Size: Adult Regular)  Pulse 66  LMP 08/05/2017  SpO2 99%  Breastfeeding? No    EXAM:  GENERAL:Appears well  ABDOMEN:  Soft, +BS  SURGICAL WOUNDS:  Incisions healing well, no enduration or drainage.- fine macular papular rash all over body. Very fine and light    .lastlab[CASEREPORT    Assessment/Plan: . Doing well after surgery and should follow up as needed. Discussed worsening symptoms to watch for.   Wilma Meng PA-C  [facility] Department of Surgery

## 2021-06-13 NOTE — PROGRESS NOTES
"ASSESSMENT/PLAN:  1. Symptomatic tonsillar crypt  Ambulatory referral to ENT   2. Need for immunization against influenza  Influenza, Seasonal,Quad Inj, 36+ MOS       This is a 37, with symptoms of persistent sore throat, and feels like there is some sort of a stone in her throat.  Her exam is relatively benign now but does have large cryptic tonsils.  It is reasonable to refer to ENT for further evaluation.    Patient also desires seasonal influenza vaccine and this was given today.        There are no discontinued medications.  There are no Patient Instructions on file for this visit.    Chief Complaint:  Chief Complaint   Patient presents with     Other     tonsil stones       HPI:   Quique Sanford is a 37 y.o. female c/o  Has had white \"cyst\" or \"stone\" in tonsils since March  More swollen on left side   Doesn't hurt  But feels swollen  No fever  Feels like rash in throat      PMH:   Patient Active Problem List    Diagnosis Date Noted     Pregnancy 07/15/2014      (vaginal birth after ) 07/15/2014      Delivery      Anemia      Common Migraine (Without Aura)      Vitamin D Deficiency      Past Medical History:   Diagnosis Date     Anemia      Cholelithiasis      Migraine      Vitamin D deficiency      Past Surgical History:   Procedure Laterality Date      SECTION       Social History     Social History     Marital status: Single     Spouse name: N/A     Number of children: N/A     Years of education: N/A     Occupational History     Not on file.     Social History Main Topics     Smoking status: Never Smoker     Smokeless tobacco: Never Used     Alcohol use No     Drug use: No     Sexual activity: Yes     Partners: Male     Birth control/ protection: None     Other Topics Concern     Not on file     Social History Narrative       Meds:    Current Outpatient Prescriptions:      ibuprofen (ADVIL,MOTRIN) 600 MG tablet, TAKE 1 TABLET BY MOUTH TWICE DAILY AS NEEDED // IB ZHENG GTZ 1 LUB, IB " "HNUB NOJ 2 ZAUG YOG MOB, Disp: , Rfl: 0    Allergies:  No Known Allergies    ROS:  Pertinent positives as noted in HPI; otherwise 12 point ROS negative.      Physical Exam:  EXAM:  BP 92/60 (Patient Site: Left Arm, Patient Position: Sitting, Cuff Size: Adult Small)  Pulse 64  Temp 98  F (36.7  C) (Oral)   Resp 14  Ht 4' 10\" (1.473 m)  Wt 122 lb 14.4 oz (55.7 kg)  LMP 09/04/2017  SpO2 98%  BMI 25.69 kg/m2   Gen:  NAD, appears well, well-hydrated  HEENT:  TMs nl, oropharynx benign with enlarged tonsils;  nasal mucosa nl, conjunctiva clear  Neck:  Supple, no adenopathy, no thyromegaly, no carotid bruits, no JVD  Lungs:  Clear to auscultation bilaterally  Cor:  RRR no murmur  Abd:  Soft, nontender, BS+, no masses, no guarding or rebound, no HSM  Extr:  Neg.  Neuro:  No asymmetry  Skin:  Warm/dry        Results:  Results for orders placed or performed during the hospital encounter of 08/12/17   Comprehensive Metabolic Panel   Result Value Ref Range    Sodium 138 136 - 145 mmol/L    Potassium 3.2 (L) 3.5 - 5.0 mmol/L    Chloride 103 98 - 107 mmol/L    CO2 25 22 - 31 mmol/L    Anion Gap, Calculation 10 5 - 18 mmol/L    Glucose 101 70 - 125 mg/dL    BUN 9 8 - 22 mg/dL    Creatinine 0.69 0.60 - 1.10 mg/dL    GFR MDRD Af Amer >60 >60 mL/min/1.73m2    GFR MDRD Non Af Amer >60 >60 mL/min/1.73m2    Bilirubin, Total 1.0 0.0 - 1.0 mg/dL    Calcium 8.9 8.5 - 10.5 mg/dL    Protein, Total 7.8 6.0 - 8.0 g/dL    Albumin 3.7 3.5 - 5.0 g/dL    Alkaline Phosphatase 46 45 - 120 U/L    AST 17 0 - 40 U/L    ALT 22 0 - 45 U/L   HM1 (CBC with Diff)   Result Value Ref Range    WBC 10.6 4.0 - 11.0 thou/uL    RBC 4.69 3.80 - 5.40 mill/uL    Hemoglobin 13.9 12.0 - 16.0 g/dL    Hematocrit 41.9 35.0 - 47.0 %    MCV 89 80 - 100 fL    MCH 29.6 27.0 - 34.0 pg    MCHC 33.2 32.0 - 36.0 g/dL    RDW 13.7 11.0 - 14.5 %    Platelets 237 140 - 440 thou/uL    MPV 9.6 8.5 - 12.5 fL    Neutrophils % 74 (H) 50 - 70 %    Lymphocytes % 16 (L) 20 - 40 %    " Monocytes % 9 2 - 10 %    Eosinophils % 1 0 - 6 %    Basophils % 0 0 - 2 %    Neutrophils Absolute 7.7 2.0 - 7.7 thou/uL    Lymphocytes Absolute 1.7 0.8 - 4.4 thou/uL    Monocytes Absolute 0.9 0.0 - 0.9 thou/uL    Eosinophils Absolute 0.1 0.0 - 0.4 thou/uL    Basophils Absolute 0.0 0.0 - 0.2 thou/uL

## 2021-06-13 NOTE — PROGRESS NOTES
Quique Sanford is a 37 y.o. female seen in consultation at the request of Dr. Chandra for sore throat for 6 months. This is episodic in nature.  She has been treated with antibiotics and the swelling of the tonsils is back to normal.  She has persistant white patch in the back of the throat.       ALLERGY:  No Known Allergies    MEDICATIONS:     Current Outpatient Prescriptions on File Prior to Visit   Medication Sig Dispense Refill     ibuprofen (ADVIL,MOTRIN) 600 MG tablet TAKE 1 TABLET BY MOUTH TWICE DAILY AS NEEDED // IB ZAUG NOJ 1 LUB, IB HNUB NOJ 2 ZAUG YOG MOB  0     No current facility-administered medications on file prior to visit.        Past Medical/Surgical History, Family History and Social History reviewed in detail and documented separately in the medical record.    Complete Review of Systems:  A 10-point review was performed.  Pertinent positives are noted in the HPI and on a separate scanned document in the chart.    EXAM:  There were no vitals filed for this visit.    Nurse documentation reviewed  and documented separately.    General Appearance: Pleasant, alert, appropriate appearance for age. No acute distress    Head Exam: Normal. Normocephalic, atraumatic.    Eye Exam: Normal external eye, conjunctiva, lids, cornea. Extra-ocular movements are intact.    Left external ear: normal  Left otoscopic exam: Normal EAC. Normal TM     Right external ear: normal  Right otoscopic exam: Normal EAC. Normal TM    Nose Exam: Normal external nose. Septum midline. Nasal mucosa normal.  Inferior turbinates normal.    OroPharynx Exam: Dental hygiene adequate. Normal tongue. Normal buccal mucosa. Normal palate.  Normal pharynx. Left tonsil which shows mucous rentention cust in superior pole or retained tonsil lith.      PROCEDURE:  Given this was symptomatic for her, I injected the area with 1% Lidocaine with 1:100,000 epinephrine and made a small incision with a 15 blade.  Cotton swabs were used to express debris from  the cyst.  She tolerated this well.    Neck Exam: Supple, no masses or nodes. Trachea and larynx midline.    Thyroid Exam: No tenderness, nodules or enlargement.    Salivary Glands: nontender without masses    Neuro: Alert and oriented times 3, CN 2-12 grossly intact, no nystagmus, PERRL, EOMI, normal speech and gait    Chest/Respiratory Exam: Normal chest wall motion and respiratory effort. No audible stridor or wheezing.    Cardiovascular Exam: Regular rate and rhythm.  No cyanosis, clubbing or edema.    Pulses: carotid pulses normal    ASSESSMENT:  1. Tonsil stone        PLAN: Findings, assessment, and management options were discussed. Area is treated.  This is certainly something that can come back.  If so, tonsillectomy could be considered if symptoms are bothersome enough.

## 2021-06-14 NOTE — PROGRESS NOTES
ASSESSMENT & PLAN    No problem-specific Assessment & Plan notes found for this encounter.      Quique was seen today for rash and urticaria.    Diagnoses and all orders for this visit:    Thyroid nodule  -     Thyroid [place]  -     US Thyroid; Future    Constipation, unspecified constipation type  -     docusate sodium (COLACE) 100 MG capsule; Take 1 capsule (100 mg total) by mouth 2 (two) times a day as needed for constipation.    Dyspepsia  -     omeprazole (PRILOSEC) 40 MG capsule; 1 daily for 30 days then transition to 20 mg Capsule    Hives  -     predniSONE (DELTASONE) 10 mg tablet; Take 40 mg by mouth daily for 4 days, THEN 20 mg daily for 4 days, THEN 10 mg daily for 4 days. Take 4 tabs daily for 3 days, then 3 tabs daily for 3 days, then 2 tabs daily for 3 days, then 1 tab daily for 3 days, then stop..  -     montelukast (SINGULAIR) 10 mg tablet; Take 1 tablet (10 mg total) by mouth daily.  -     cetirizine (ZYRTEC) 10 MG tablet; Take 1 tablet (10 mg total) by mouth daily.  -     Comprehensive Metabolic Panel  -     Cancel: HM2(CBC w/o Differential)  -     HM1(CBC and Differential)        Patient Instructions   It was nice to see you today Quique    I think part of your problem is a contact allergen/compound particle that was stirred up by your driveline may have triggered your hives  This is tough to figure out what the exact trigger is  It could be mold, dust, particulate matter from drywalling or grabbing    Use the prednisone 10 mg tablets  Take 4 tablets daily for 4 days then  Take 2 tablets daily for 4 days then  Take 1 tablet daily for 4 days then stop  After you have finished the prednisone start  Montelukast 10 mg at bedtime    Use the cetirizine 10 mg daily as needed for itching    You are always welcome to see an allergist    I have also ordered a thyroid ultrasound to evaluate the nodule          No follow-ups on file.            CHIEF COMPLAINT: Quique Sanford had concerns including Rash (x 2 months)  and Urticaria.    [place]: 1.............. SUBJECTIVE:  Quique Sanford is a 40 y.o. female had concerns including Rash (x 2 months) and Urticaria.    1. Thyroid nodule    2. Constipation, unspecified constipation type    3. Dyspepsia    4. Hives      Hives  Reviewed her note from Dr. Dyer  Rash began on body than face  No new medications  No pets  No laundry detergents or soaps  The family is been rehabilitating her basement since September    The skin is itchy    Review of systems otherwise negative    This is been ongoing for 2 months is gotten somewhat better with use of medications        No Known Allergies                      SOCIAL: She  reports that she has never smoked. She has never used smokeless tobacco. She reports that she does not drink alcohol or use drugs.    REVIEW OF SYSTEMS:   Family history not pertinent to chief complaint or presenting problem    Review of systems otherwise negative as requested from patient, except   Those positive ROS outlined and discussed in [place].      VITALS:  Vitals:    01/08/21 1603   BP: 110/68   Patient Site: Left Arm   Patient Position: Sitting   Cuff Size: Adult Regular   Pulse: 78   SpO2: 99%     Wt Readings from Last 3 Encounters:   12/22/20 131 lb (59.4 kg)   01/31/20 127 lb 8 oz (57.8 kg)   01/03/20 128 lb (58.1 kg)     There is no height or weight on file to calculate BMI.    Physical Exam:  Activated skin on the left upper arm  A few acneform lesions in the back  Recently tattooed eyebrows  Lungs clear  Cardiac no murmur  5 mm thyroid nodule left thyroid  No cervical or supraclavicular nodes  No splinter hemorrhages  No rash on the palms         I spent 30  minutes with this patient.  This includes pre-visit, intra-visit and post visit work an evaluation with regards to Quique was seen today for rash and urticaria.    Diagnoses and all orders for this visit:    Thyroid nodule  -     Thyroid [place]  -     US Thyroid; Future    Constipation, unspecified constipation  type  -     docusate sodium (COLACE) 100 MG capsule; Take 1 capsule (100 mg total) by mouth 2 (two) times a day as needed for constipation.    Dyspepsia  -     omeprazole (PRILOSEC) 40 MG capsule; 1 daily for 30 days then transition to 20 mg Capsule    Hives  -     predniSONE (DELTASONE) 10 mg tablet; Take 40 mg by mouth daily for 4 days, THEN 20 mg daily for 4 days, THEN 10 mg daily for 4 days. Take 4 tabs daily for 3 days, then 3 tabs daily for 3 days, then 2 tabs daily for 3 days, then 1 tab daily for 3 days, then stop..  -     montelukast (SINGULAIR) 10 mg tablet; Take 1 tablet (10 mg total) by mouth daily.  -     cetirizine (ZYRTEC) 10 MG tablet; Take 1 tablet (10 mg total) by mouth daily.  -     Comprehensive Metabolic Panel  -     Cancel: HM2(CBC w/o Differential)  -     HM1(CBC and Differential)        Robert Chandra MD  [facility] [address] [phone]

## 2021-06-14 NOTE — TELEPHONE ENCOUNTER
Patient advised per provider message below. The patient indicates understanding of the result and instructions for follow up and continued care. She will call [facility], Phone: [phone]. Provided pt with specialty #. Order faxed.

## 2021-06-14 NOTE — PROGRESS NOTES
"S:  Quique Sanford is a 40 y.o. female who comes to the clinic today for  1.  Rash over arms, legs, back, trunk.  It ha faded some at this time. It started a month ago.  No history of similar.    If it was itchy and she scratched it, then it formed a \"pimple\"  The pt showed me these today, along with a photo that showed some hives.    She denies any new products at home. She did get a new tattoo for her eyebrows about 1-2 months ago.  She got her brows done 2 weeks before this all started.    No wheezing, lip swelling, difficulty breathing, difficulty swallowing.  No history of allergies or anaphylaxis.    She has tried some allergy pills, dr. Chandra gave her cetirizine, which she has been taking.  This doesn't help when it is very itchy . She is taking it daily .   She has also been doing a portable steamer.    No history of liver or kidney problems.  No abdominal pain.   lft's noted from 12/28/2019 that were normal.    Pt denies any possibility of pregnancy, her lmp was just spotting.  It was not normal for her, but this is also not new.  She is not using any birth control. She declines a pregnancy test at this time.   No new hair loss, dry skin, constipation or diarrhea.  No thyroid problems in the family.    I reviewed the pertinent family, social, surgical, medical history.      O:  /70   Pulse 78   Temp 98.1  F (36.7  C) (Oral)   Resp 16   Wt 131 lb (59.4 kg)   LMP 12/16/2020   Breastfeeding No   BMI 27.13 kg/m    Gen:  Nad, alert  Skin:  Hives noted on upper arm.  Small rash over arms and legs, slightly raised.  No papules. Also over back there are areas of erythema.   No labored breathing. No wheezing noted.        Patient Active Problem List   Diagnosis     Anemia     Common Migraine (Without Aura)     Vitamin D Deficiency     Current Outpatient Medications on File Prior to Visit   Medication Sig Dispense Refill     cetirizine (ZYRTEC) 10 MG tablet Take 1 tablet (10 mg total) by mouth daily. As " needed for allergy reaction 30 tablet 3     docusate sodium (COLACE) 100 MG capsule Take 1 capsule (100 mg total) by mouth 2 (two) times a day as needed for constipation. 60 capsule 5     omeprazole (PRILOSEC) 40 MG capsule 1 daily for 30 days then transition to 20 mg Capsule 30 capsule 0     polyethylene glycol (GLYCOLAX) 17 gram/dose powder Take 17 g by mouth daily as needed (constipation). 235 g 0     sucralfate (CARAFATE) 1 gram tablet Take 1 tablet (1 g total) by mouth 4 (four) times a day. For 30 days more 120 tablet 0     No current facility-administered medications on file prior to visit.           No results found for this or any previous visit (from the past 48 hour(s)).     No images are attached to the encounter or orders placed in the encounter.       Assessment/Plan:  1. Hives  Use cream over skin/  Try to avoid scratching.  Avoid hot showers and baths.   Use cetirizine 20mg po qam and benadryl at night.    Avoid steaming.   If no improvement, will refer to allergy for further evaluation.    - diphenhydrAMINE (BENADRYL) 25 mg capsule; Take 2 capsules (50 mg total) by mouth every 4 (four) hours as needed for itching.  Dispense: 90 capsule; Refill: 2  - cetirizine (ZYRTEC) 10 MG tablet; Take 1 tablet (10 mg total) by mouth daily. As needed for allergy reaction  Dispense: 30 tablet; Refill: 3  - hydrocortisone with aloe 1 % Crea cream; Use over affected area twice daily  Dispense: 224 g; Refill: 12    Pt declines a pregnancy  Test today .   I did recommend that she consider a thyroid test, liver tests, and cbc if no improvement in condition.        Daughter served as  today per pts request.      Renu Arzate   12/22/2020 4:43 PM     /

## 2021-06-14 NOTE — TELEPHONE ENCOUNTER
Resent       40 mg daily for 4 days then 20 mg daily for 4 days 10 mg daily for 4 days then stop  28 tablets  DanL

## 2021-06-14 NOTE — PATIENT INSTRUCTIONS - HE
It was nice to see you today Youa    I think part of your problem is a contact allergen/compound particle that was stirred up by your driveline may have triggered your hives  This is tough to figure out what the exact trigger is  It could be mold, dust, particulate matter from drywalling or grabbing    Use the prednisone 10 mg tablets  Take 4 tablets daily for 4 days then  Take 2 tablets daily for 4 days then  Take 1 tablet daily for 4 days then stop  After you have finished the prednisone start  Montelukast 10 mg at bedtime    Use the cetirizine 10 mg daily as needed for itching    You are always welcome to see an allergist    I have also ordered a thyroid ultrasound to evaluate the nodule

## 2021-06-14 NOTE — TELEPHONE ENCOUNTER
----- Message from Robert Chandra MD sent at 1/16/2021  9:00 AM CST -----    Nodule 1: 1.4 x 1.3 x 1.5 cm superior aspect left lobe.   Composition: Solid or almost completely solid, 2 points   Echogenicity: Hypoechoic, 2 points   Shape: Not taller than wide, 0 points   Margin: Ill-defined, 0 points   Echogenic Foci: Punctate echoic foci, 3 points   Point Total: 7 points or more. TI-RADS 5. If 1 cm or larger, recommend FNA; if 0.5 cm or larger, follow up US annually for 5 years.      Nodule 2: 1.2 x 0.8 x 1.2 cm right aspect of the isthmus.  Composition: Solid or almost completely solid, 2 points   Echogenicity: Hypoechoic, 2 points   Shape: Not taller than wide, 0 points   Margin: Ill-defined, 0 points   Echogenic Foci: None, or large comet-tail artifacts, 0 points   Point Total: 4-6 points. TI-RADS 4. If 1.5 cm or larger, recommend FNA; if 1 cm or larger, follow up US (annually for 5 years).     IMPRESSION:   1.  Two dominant nodules one in the superior aspect of the left lobe is category TI-RADS 5 and ultrasound-guided FNA biopsy is recommended.  2.  Second nodule in the right aspect of the isthmus is TI-RADS 4 lesion but less than 1.5 cm and follow-up ultrasound in one year is recommended.    Youa these are all OK     Not sure what has triggered your hives.    The Thyroid nodules    One has a recommendation for a biopsy to exclude abnormal Tissue    I will send in a referral to Ear Nose and Throat to manage this     Please reach out to assist in scheduling this.      ENT referral to Biopsy and Letter Sent and Task to contact patient sent   Next Step Biopsy to exclude abnormal Tissue       Kristin

## 2021-06-14 NOTE — TELEPHONE ENCOUNTER
Clinic Action Needed: Yes. Please call Sandy Meredith at [phone]    Reason for Call: Sandy Meredith calling to  Clarify dosage instructions for Prednisalone 10 tablet. There are two directions on the dosage instructions and they need clarification.     Routed to: Robert Chandra Care Team [place].    Kevin Mantilla RN  [facility]

## 2021-06-14 NOTE — TELEPHONE ENCOUNTER
LMTCB through  line   Please relay message to pt       Youa these are all OK     Not sure what has triggered your hives.    The Thyroid nodules    One has a recommendation for a biopsy to exclude abnormal Tissue    I will send in a referral to Ear Nose and Throat to manage this     Please reach out to assist in scheduling this.      ENT referral to Biopsy and Letter Sent and Task to contact patient sent   Next Step Biopsy to exclude abnormal Tissue       Kristin

## 2021-06-15 NOTE — PROGRESS NOTES
ASSESSMENT & PLAN      Quique was seen today for follow-up and foot pain.    Diagnoses and all orders for this visit:    Anemia  -     Thyroid Cascade  -     HM1(CBC and Differential)  -     Iron and Transferrin Iron Binding Capacity  -     Ferritin  -     HM1 (CBC with Diff)    Gallbladder disorder  -     Comprehensive Metabolic Panel    Low vitamin D level    Vitamin D deficiency  -     Vitamin D, Total (25-Hydroxy)    URI (upper respiratory infection)  -     Influenza A/B Rapid Test    Other orders  -     naproxen (NAPROSYN) 375 MG tablet; Take 1 tablet (375 mg total) by mouth 2 (two) times a day with meals.        No Follow-up on file.           CHIEF COMPLAINT: Quique Sanford had concerns including Follow-up and Foot Pain.    [place]: 1.............. had concerns including Follow-up and Foot Pain.    1. Anemia    2. Gallbladder disorder    3. Low vitamin D level    4. Vitamin D deficiency    5. URI (upper respiratory infection)      No problem-specific Assessment & Plan notes found for this encounter.      CC:              Bilateral foot pain    What's it like:                      Muscular  How long is it ongoing:      X 3 weeks  What makes it worse :       Walking, using feet  What makes it better:         Massage  0/10-10/10:Pain/Intesity     3      Any associated Sx to above complaint:   F/u gallbladder removal        SUBJECTIVE:  Quique Sanford is a 37 y.o. female    Past Medical History:   Diagnosis Date     Anemia      Cholelithiasis      Migraine      Vitamin D deficiency      Past Surgical History:   Procedure Laterality Date      SECTION       CHOLECYSTECTOMY       Review of patient's allergies indicates no known allergies.  Current Outpatient Prescriptions   Medication Sig Dispense Refill     naproxen (NAPROSYN) 375 MG tablet Take 1 tablet (375 mg total) by mouth 2 (two) times a day with meals. 28 tablet 0     No current facility-administered medications for this visit.      Family History   Problem  Relation Age of Onset     No Medical Problems Mother      No Medical Problems Father      Social History     Social History     Marital status: Single     Spouse name: N/A     Number of children: N/A     Years of education: N/A     Social History Main Topics     Smoking status: Never Smoker     Smokeless tobacco: Never Used     Alcohol use No     Drug use: No     Sexual activity: Yes     Partners: Male     Birth control/ protection: None     Other Topics Concern     None     Social History Narrative     Patient Active Problem List   Diagnosis      Delivery     Anemia     Common Migraine (Without Aura)     Vitamin D Deficiency     Pregnancy      (vaginal birth after )                                              SOCIAL: She  reports that she has never smoked. She has never used smokeless tobacco. She reports that she does not drink alcohol or use illicit drugs.    REVIEW OF SYSTEMS:   Family history not pertinent to chief complaint or presenting problem    Review of systems otherwise negative as requested from patient, except   Those positive ROS outlined and discussed in [facility] [place] [facility].    OBJECTIVE:  BP 98/52 (Patient Site: Left Arm, Patient Position: Sitting, Cuff Size: Adult Regular)  Pulse 68  Wt 123 lb 8 oz (56 kg)  BMI 25.81 kg/m2    GENERAL:     No acute distress.   Alert and oriented X 3         Physical:    Thyroid prep nontender without nodules  Neck is supple cervical or subclavicular nodes  Lungs clear  Cardiac no murmur regular rate and rhythm  No skin rash  Full range of affect  Oropharynx is clear  His mucosa is mildly congested  TMs are clear  No evidence of fluid  Abdomen soft flat nontender with positive bowel sounds  Tenderness to palpation across her right greater than left plantar fascia which reproduces her pain  Palpable distal pulses  No crepitus with passive range of motion  No ganglion cyst  No edema  No calluses        ASSESSMENT & PLAN      Quique was seen today for follow-up  and foot pain.    Diagnoses and all orders for this visit:    Anemia  -     Thyroid Cascade  -     HM1(CBC and Differential)  -     Iron and Transferrin Iron Binding Capacity  -     Ferritin  -     HM1 (CBC with Diff)    Gallbladder disorder  -     Comprehensive Metabolic Panel    Low vitamin D level    Vitamin D deficiency  -     Vitamin D, Total (25-Hydroxy)    URI (upper respiratory infection)  -     Influenza A/B Rapid Test    Other orders  -     naproxen (NAPROSYN) 375 MG tablet; Take 1 tablet (375 mg total) by mouth 2 (two) times a day with meals.        No Follow-up on file.       Anticipatory Guidance and Symptomatic Cares Discussed   Advised to call back directly if there are further questions, or if these symptoms fail to improve as anticipated or worsen.  Return to clinic if patient has a clinical concern that warrants an exam.        25  Min Total Time, > 50% counseling and coordination of Care    Robert Chandra MD  Family Medicine   [facility] [address] [phone]

## 2021-06-15 NOTE — TELEPHONE ENCOUNTER
RN cannot approve Refill Request    RN can NOT refill this medication Patient needs differnet dose of omeprazole and needs new script for 20 mg tabs. Last office visit: 2021 Robert Chandra MD Last Physical: Visit date not found Last MTM visit: Visit date not found Last visit same specialty: 2021 Robert Chandra MD.  Next visit within 3 mo: Visit date not found  Next physical within 3 mo: Visit date not found      Tonia Connors, [facility] Triage/Med Refill 2/15/2021    Requested Prescriptions   Pending Prescriptions Disp Refills     omeprazole (PRILOSEC) 40 MG capsule 30 capsule 0     Si daily for 30 days then transition to 20 mg Capsule       GI Medications Refill Protocol Passed - 2/15/2021  5:25 PM        Passed - PCP or prescribing provider visit in last 12 or next 3 months.     Last office visit with prescriber/PCP: 2021 Robert Chandra MD OR same dept: 2021 Robert Chandra MD OR same specialty: 2021 Robert Chandra MD  Last physical: Visit date not found Last MTM visit: Visit date not found   Next visit within 3 mo: Visit date not found  Next physical within 3 mo: Visit date not found  Prescriber OR PCP: Robert Chandra MD  Last diagnosis associated with med order: 1. Dyspepsia  - omeprazole (PRILOSEC) 40 MG capsule; 1 daily for 30 days then transition to 20 mg Capsule  Dispense: 30 capsule; Refill: 0    If protocol passes may refill for 12 months if within 3 months of last provider visit (or a total of 15 months).

## 2021-06-17 NOTE — PROGRESS NOTES
[facility]  OFFICE VISIT - FAMILY MEDICINE     ASSESSMENT AND PLAN     1. Anxiety  No SI/HI. No known increase stressors. Will start celexa 10mg daily, psychology referral, labs today. Can continue hydroxyzine TID prn. Celexa medication use and side effects including black box warning discussed. Pt to follow up in 3-4 weeks.     Ambulatory referral to Psychology    citalopram (CELEXA) 10 MG tablet    hydrOXYzine pamoate (VISTARIL) 25 MG capsule    HM1(CBC and Differential)    Comprehensive Metabolic Panel    Thyroid Stimulating Hormone (TSH)    Vitamin D, Total (25-Hydroxy)    HM1 (CBC with Diff)     2. Nonintractable headache, unspecified chronicity pattern, unspecified headache type  Dull achy headache, has not improved with otc meds. Toradol in clinic today. Continue tylenol/ibuprofen as needed. Dosing reviewed.    ketorolac injection 30 mg (TORADOL)       CHIEF COMPLAINT     Anxiety (pt states feeling scared ); Depression (pt states having depression from anxiety ); and Headache (pt states having headaches and muscle aches )    HPI     Quique Sanford is a 37 y.o. female past with medical history as below who presents today for evaluation of anxiety. Reports about 1 month ago, got goosebumps while at home and since that time has been feeling scared and anxious for no reason that she is aware of. Has not had this problem in the past. Last week symptoms were the worst and she felt like she did not want to leave her home. Denies any change in stressors recently. No major life changes. No diet changes. Denies SI/HI. Reports attends [employer] for support. Patient and partner denies that patient has had any irrational or erratic behavior. She is not hearing any voices. Was seen in ED, prescribed hydroxyzine and recommended to follow up with PCP, has not been taking medication because she thought that it was for her skin. Reports ongoing dull headache and muscle tension, taking naproxen prn with some relief.  Not worse headache of her life. No CP or SOB. No fevers, chills, nausea, vomiting or diarrhea. No numbness, tingling, weakness, or dizziness.       Review of Systems  12-point review of systems completed and as per HPI, otherwise negative.     PMSH     Past Medical History:   Diagnosis Date     Anemia      Cholelithiasis      Migraine      Vitamin D deficiency      Past Surgical History:   Procedure Laterality Date      SECTION       CHOLECYSTECTOMY         PFSH     Family History   Problem Relation Age of Onset     No Medical Problems Mother      No Medical Problems Father      Social History     Social History     Marital status: Single     Spouse name: N/A     Number of children: N/A     Years of education: N/A     Occupational History     Not on file.     Social History Main Topics     Smoking status: Never Smoker     Smokeless tobacco: Never Used     Alcohol use No     Drug use: No     Sexual activity: Yes     Partners: Male     Birth control/ protection: None     Other Topics Concern     Not on file     Social History Narrative     Relevant history was reviewed with the patient today, unless noted in HPI, is not pertinent for this visit.    MEDICATIONS     Current Outpatient Prescriptions on File Prior to Visit   Medication Sig Dispense Refill     hydrOXYzine pamoate (VISTARIL) 25 MG capsule Take 1 capsule (25 mg total) by mouth 3 (three) times a day as needed for itching. 20 capsule 0     naproxen (NAPROSYN) 375 MG tablet Take 1 tablet (375 mg total) by mouth 2 (two) times a day with meals. 28 tablet 0     No current facility-administered medications on file prior to visit.        OBJECTIVE   /64 (Patient Site: Left Arm, Patient Position: Sitting, Cuff Size: Adult Regular)  Pulse 77  Resp 16  Wt 122 lb 8 oz (55.6 kg)  LMP 2018  SpO2 99%  Breastfeeding? No  BMI 25.6 kg/m2    Physical Exam  Physical Examination: General appearance - alert, well appearing, and in no distress  Mental  status - alert, oriented to person, place, and time, denies SI/HI  Chest - clear to auscultation, no wheezes, rales or rhonchi, symmetric air entry  Heart - normal rate, regular rhythm, normal S1, S2, no murmurs, rubs, clicks or gallops  Neurological - alert, oriented, normal speech, no focal findings or movement disorder noted  Musculoskeletal - no joint tenderness, deformity or swelling  Extremities - peripheral pulses normal, no pedal edema, no clubbing or cyanosis  Skin - normal coloration and turgor, no rashes, no suspicious skin lesions noted      RESULTS/CONSULTS (Lab/Radiology)     Recent Results (from the past 168 hour(s))   Urinalysis-UC if Indicated   Result Value Ref Range    Color, UA Colorless Colorless, Yellow, Straw, Light Yellow    Clarity, UA Clear Clear    Glucose, UA Negative Negative    Bilirubin, UA Negative Negative    Ketones, UA Negative Negative, 60 mg/dL    Specific Gravity, UA 1.000 (L) 1.001 - 1.030    Blood, UA Negative Negative    pH, UA 6.5 4.5 - 8.0    Protein, UA Negative Negative mg/dL    Urobilinogen, UA <2.0 E.U./dL <2.0 E.U./dL, 2.0 E.U./dL    Nitrite, UA Negative Negative    Leukocytes, UA Negative Negative   Pregnancy (Beta-hCG, Qual), Urine   Result Value Ref Range    Pregnancy Test, Urine Negative Negative    Specific Gravity, UA 1.000 (L) 1.001 - 1.030   Basic Metabolic Panel   Result Value Ref Range    Sodium 141 136 - 145 mmol/L    Potassium 3.1 (L) 3.5 - 5.0 mmol/L    Chloride 106 98 - 107 mmol/L    CO2 24 22 - 31 mmol/L    Anion Gap, Calculation 11 5 - 18 mmol/L    Glucose 114 70 - 125 mg/dL    Calcium 9.5 8.5 - 10.5 mg/dL    BUN 4 (L) 8 - 22 mg/dL    Creatinine 0.65 0.60 - 1.10 mg/dL    GFR MDRD Af Amer >60 >60 mL/min/1.73m2    GFR MDRD Non Af Amer >60 >60 mL/min/1.73m2       HEALTH MAINTENANCE / SCREENING     PHQ-2 Total Score: 3 (4/10/2018 11:00 AM), No Data Recorded,No Data Recorded    Health Maintenance   Topic Date Due     ADVANCE DIRECTIVES DISCUSSED WITH PATIENT   05/05/1998     PAP SMEAR  04/26/2017     TD 18+ HE  01/16/2028     INFLUENZA VACCINE RULE BASED  Completed     TDAP ADULT ONE TIME DOSE  Completed     Total time was 25 minutes, greater than 50% counseling and coordinating care regarding the above issues.    Anne Lim, CNP  Family Medicine, [facility]

## 2021-06-17 NOTE — TELEPHONE ENCOUNTER
RN cannot approve Refill Request    RN can NOT refill this medication med is not covered by policy/route to provider. Last office visit: 1/8/2021 Robert Chandra MD Last Physical: Visit date not found Last MTM visit: Visit date not found Last visit same specialty: Visit date not found.  Next visit within 3 mo: Visit date not found  Next physical within 3 mo: Visit date not found      [facility], [facility] Triage/Med Refill 4/26/2021    Requested Prescriptions   Pending Prescriptions Disp Refills     montelukast (SINGULAIR) 10 mg tablet [Pharmacy Med Name: MONTELUKAST 10MG TABLETS] 30 tablet 2     Sig: TAKE 1 TABLET(10 MG) BY MOUTH DAILY       There is no refill protocol information for this order

## 2021-06-17 NOTE — PROGRESS NOTES
[facility]  OFFICE VISIT - FAMILY MEDICINE     ASSESSMENT AND PLAN     1. Rash  Cause unclear. Symptoms improving overall. Possible allergic reaction or contact dermatitis. No difficulty swallowing or breathing. Symptoms improving overall. Medrol dosepack, zyrtec, and topical triamcinolone cream. Pt to notify clinic if symptoms worsen or do not start to resolve within the week. If persistent, consider dermatology.     methylPREDNISolone (MEDROL DOSEPACK) 4 mg tablet    triamcinolone (KENALOG) 0.1 % cream       CHIEF COMPLAINT     Rash (pt states deep itchy rash, pt states rash is not superficial feels deep went to [facility] with Dr. Lockhart. pt states 1x week now after eating can tuna. pt states history of rash last year, not sure what caused it. )    HPI     Quique Sanford is a 37 y.o. female with past medical history as below who presents today for evaluation of pruritic rash. Reports started about 1 week ago, believes symptoms started after eating tuna. Reports symptoms improving somewhat but still very itchy.  No history of food allergies in the past. Seen at outside clinic and prescribed prednisone, zyrtec. Took prednisone x 4 days, did not take zyrtec. Reports symptoms did not improve. Not using anything topically. Did try cupping with minimal relief. Had similar symptoms one year ago in April. Denies any new soaps, lotions, shampoos, or detergents. No household contacts with similar symptoms. Feeling well overall otherwise. No CP or SOB. No fevers, chills, nausea, vomiting or diarrhea.       Review of Systems  12-point review of systems completed and as per HPI, otherwise negative.     PMSH     Past Medical History:   Diagnosis Date     Anemia      Cholelithiasis      Migraine      Vitamin D deficiency      Past Surgical History:   Procedure Laterality Date      SECTION       CHOLECYSTECTOMY         PFS     Family History   Problem Relation Age of Onset     No Medical Problems  Mother      No Medical Problems Father      Social History     Social History     Marital status: Single     Spouse name: N/A     Number of children: N/A     Years of education: N/A     Occupational History     Not on file.     Social History Main Topics     Smoking status: Never Smoker     Smokeless tobacco: Never Used     Alcohol use No     Drug use: No     Sexual activity: Yes     Partners: Male     Birth control/ protection: None     Other Topics Concern     Not on file     Social History Narrative     Relevant history was reviewed with the patient today, unless noted in HPI, is not pertinent for this visit.    MEDICATIONS     Current Outpatient Prescriptions on File Prior to Visit   Medication Sig Dispense Refill     ergocalciferol (ERGOCALCIFEROL) 50,000 unit capsule Take 1 capsule (50,000 Units total) by mouth once a week for 12 doses. 12 capsule 0     citalopram (CELEXA) 10 MG tablet Take 1 tablet (10 mg total) by mouth daily. For anxiety. 30 tablet 1     hydrOXYzine pamoate (VISTARIL) 25 MG capsule Take 1 capsule (25 mg total) by mouth 3 (three) times a day as needed for itching or anxiety. 60 capsule 0     naproxen (NAPROSYN) 375 MG tablet Take 1 tablet (375 mg total) by mouth 2 (two) times a day with meals. 28 tablet 0     No current facility-administered medications on file prior to visit.        OBJECTIVE   /62 (Patient Site: Right Arm, Patient Position: Sitting, Cuff Size: Adult Regular)  Pulse 76  Resp 16  Wt 120 lb (54.4 kg)  SpO2 98%  Breastfeeding? No  BMI 25.08 kg/m2    Physical Exam  Physical Examination: General appearance - alert, well appearing, and in no distress  Chest - clear to auscultation, no wheezes, rales or rhonchi, symmetric air entry  Heart - normal rate, regular rhythm, normal S1, S2, no murmurs, rubs, clicks or gallops  Neurological - alert, oriented, normal speech, no focal findings or movement disorder noted  Musculoskeletal - no joint tenderness, deformity or  swelling  Extremities - peripheral pulses normal, no pedal edema, no clubbing or cyanosis  Skin - diffuse erythematous papular rash to bilateral arms, legs, and back, no blisters, no drainage, no extended redness, swelling or warmth    RESULTS/CONSULTS (Lab/Radiology)   No results found for this or any previous visit (from the past 168 hour(s)).    HEALTH MAINTENANCE / SCREENING     PHQ-2 Total Score: 3 (4/10/2018 11:00 AM), PHQ-9 Total Score: 12 (4/10/2018 11:00 AM),No Data Recorded    Health Maintenance   Topic Date Due     ADVANCE DIRECTIVES DISCUSSED WITH PATIENT  05/05/1998     PAP SMEAR  04/26/2017     TD 18+ HE  01/16/2028     INFLUENZA VACCINE RULE BASED  Completed     TDAP ADULT ONE TIME DOSE  Completed       Anne Lim CNP  Family Medicine, [facility]

## 2021-06-18 NOTE — PATIENT INSTRUCTIONS - HE
Patient Instructions by Renu Arzate MD at 12/22/2020  4:45 PM     Author: Renu Arzate MD Service: -- Author Type: Physician    Filed: 12/22/2020  5:01 PM Encounter Date: 12/22/2020 Status: Signed    : Renu Arzate MD (Physician)       Patient Education   use cetirizine 2 tablets in the am.  Use benadryl 50mg every night.         Hives (Adult)  Hives are pink or red bumps on the skin. These bumps are also known as wheals. The bumps can itch, burn, or sting. Hives can occur anywhere on the body. They vary in size and shape and can form in clusters. Individual hives can appear and go away quickly. New hives may develop as old ones fade. Hives are common and usually harmless. Occasionally hives are a sign of a serious allergy.  Hives are often caused by an allergic reaction. It may be an allergic reaction to foods such as fruit, shellfish, chocolate, nuts, or tomatoes. It may be a reaction to pollens, animal fur, or mold spores. Medicines, chemicals, and insect bites can also cause hives. And hives can be caused by hot sun or cold air. The cause of hives can be difficult to find.  You may be given medicines to relieve swelling and itching. Follow all instructions when using these medicines. The hives will usually fade in a few days, but can last up to 2 weeks.  Home care  Follow these tips:    Try to find the cause of the hives and eliminate it. Discuss possible causes with your healthcare provider. Future reactions to the same allergen may be worse.    Dont scratch the hives. Scratching will delay healing. To reduce itching, apply cool, wet compresses to the skin.    Dress in soft, loose cotton clothing.    Dont bathe in hot water. This can make the itching worse.    Apply an ice pack or cool pack wrapped in a thin towel to your skin. This will help reduce redness and itching. But if your hives were caused by exposure to cold, then do not apply more cold to them.    You may use  over-the counter antihistamines to reduce itching. Some older antihistamines, such as diphenhydramine and chlorpheniramine, are inexpensive. But they need to be taken often and may make you sleepy. They are best used at bedtime. Dont use diphenhydramine if you have glaucoma or have trouble urinating because of an enlarged prostate. Newer antihistamines, such as loratadine, cetirizine, and fexofenadine, are generally more expensive. But they tend to have fewer side effects, such as drowsiness. They can be taken less often.    Another type of antihistamine is used to treat heartburn. This type includes ranitidine, nizatidine, famotidine, and cimetidine. These are sometimes used along with the above antihistamines if a single medicine is not working.  Follow-up care  Follow up with your healthcare provider if your symptoms don't get better in 2 days. Ask your provider about allergy testing if you have had a severe reaction, or have had several episodes of hives. He or she can use the allergy testing to find out what you are allergic to.  When to seek medical advice  Call your healthcare provider right away if any of these occur:    Fever of 100.4 F (38.0 C) or higher, or as directed by your healthcare provider    Redness, swelling, or pain    Foul-smelling fluid coming from the rash  Call 911  Call 911 if any of the following occur:    Swelling of the face, throat, or tongue    Trouble breathing or swallowing    Dizziness, weakness, or fainting  Date Last Reviewed: 9/1/2016 2000-2017 [employer]. [address] [place] [address], [place] [address]. All rights reserved. This information is not intended as a substitute for professional medical care. Always follow your healthcare professional's instructions.

## 2021-06-18 NOTE — LETTER
Letter by Robert Chandra MD at      Author: Robert Chandra MD Service: -- Author Type: --    Filed:  Encounter Date: 1/25/2019 Status: (Other)       Quique Sanford  [address] [place] [address]      January 25, 2019      Dear MsLeyla Sanford,     At [facility], we care about your health and well-being. Your primary care provider is committed to ensuring you receive high quality care and has chosen a network of specialists to assist in providing that care. Recently Dr. Chandra referred you to [facility] for specialty care.        It is important to your overall health to follow through with the recommendation from your provider. Please call [phone] at your earliest convenience for assistance in scheduling an appointment.  If you have already scheduled this appointment, please disregard this notice.        Sincerely,        [facility] Specialty Scheduling

## 2021-06-18 NOTE — PATIENT INSTRUCTIONS - HE
Patient Instructions by Carline Oacmpo CNP at 6/19/2020 10:30 AM     Author: Carline Ocampo CNP Service: -- Author Type: Nurse Practitioner    Filed: 6/19/2020 11:15 AM Encounter Date: 6/19/2020 Status: Signed    : Carline Ocampo CNP (Nurse Practitioner)         Patient Education     [employer] Video Sheets  Symptoms of COVID-19 Infection  You're not feeling well. You're worried you may be infected with the COVID-19 virus. But what are the signs? Here's what to look for.  To watch the video:  Scan the QR code  Using your mobile device, scan the following code:       OR  Go to the website:  [email or web address]  Enter the prescription code:   RQ9     2020 [employer]           Patient Education     Pharyngitis: Strep (Presumed)    You have pharyngitis (sore throat). The healthcare staff think your sore throat is caused by streptococcus (strep) bacteria. This is often called strep throat. Strep throat can cause throat pain that is worse when swallowing, aching all over, headache, and fever. The infection is contagious. It may be spread by coughing, kissing, or touching others after touching your mouth or nose. Antibiotic medicine is given to treat the infection.  Home care    Rest at home. Drink plenty of fluids so you wont get dehydrated.    Stay home from work or school for the first 2 days of taking the antibiotics. After this time, you will not be contagious. You can then return to work or school if you are feeling better.     Take the antibiotic medicine for the full 10 days, even when you feel better. This is very important to make sure the infection is fully treated. It is also important to prevent medicine-resistant germs from growing. If you were given an antibiotic shot, no more antibiotics are needed.    You may use acetaminophen or ibuprofen to control pain or fever, unless another medicine was prescribed for this. If you have chronic liver or kidney disease or ever had a  stomach ulcer or GI bleeding, talk with your healthcare provider before using these medicines.    Use throat lozenges or a throat-numbing spray to help reduce throat pain. Gargling with warm salt water can also help reduce throat pain. Dissolve 1/2 teaspoon of salt in 1 glass of warm water.     Dont eat salty or spicy foods. These can irritate the throat.  Follow-up care  Follow up with your healthcare provider or our staff if you don't get better over the next week.  When to seek medical advice  Call your healthcare provider right away if any of these occur:    Fever as directed by your healthcare provider    New or worse ear pain, sinus pain, or headache    Painful lumps in the back of neck    Stiff neck    Lymph nodes that get larger    Cant swallow liquids, a lot of drooling, or cant open mouth wide due to throat pain    Signs of dehydration, such as very dark urine or no urine, sunken eyes, dizziness    Trouble breathing or noisy breathing    Muffled voice    New rash  Prevention  Here are steps you can take to help prevent an infection:    Keep good hand washing habits.    Dont have close contact with people who have sore throats, colds, or other upper respiratory infections.    Dont smoke, and stay away from secondhand smoke.    Stay up to date with of your vaccines.  Date Last Reviewed: 11/1/2017 2000-2017 [employer]. [address], [place] [address]. All rights reserved. This information is not intended as a substitute for professional medical care. Always follow your healthcare professional's instructions.

## 2021-06-18 NOTE — PROGRESS NOTES
"ASSESSMENT & PLAN    No problem-specific Assessment & Plan notes found for this encounter.      Quique was seen today for tingling.    Diagnoses and all orders for this visit:    Perioral dermatitis  -     Ambulatory referral to Dermatology    Dermatitis  -     Ambulatory referral to Dermatology    Acne, unspecified acne type  -     Ambulatory referral to Dermatology    Medial epicondylitis of elbow, unspecified laterality    Plantar fasciitis    Other orders  -     minocycline (MINOCIN,DYNACIN) 100 MG capsule; Take 1 capsule (100 mg total) by mouth 2 (two) times a day for 21 days.  -     tretinoin (RETIN-A) 0.025 % cream; Apply topically at bedtime. Thin film to face and neck may dry skin  -     desonide (DESOWEN) 0.05 % cream; Apply to skin in the a.m. for rash for up to 2 weeks        No Follow-up on file.           CHIEF COMPLAINT: Quique Sanford had concerns including Tingling (hands and feet x 1 week).    [place]: 1.............. had concerns including Tingling (hands and feet x 1 week).    1. Perioral dermatitis    2. Dermatitis    3. Acne, unspecified acne type    4. Medial epicondylitis of elbow, unspecified laterality    5. Plantar fasciitis          CC:          Hands and Feet     Feet  No burning \"cold Sensation   Sensitive Every Day  \"itching\"  Feet in AM, If cover feet  OK  Walking rest walking   Hands  Both Tingling and COld NO Pain  Everyday worse day         Patient describes tingling in her feet on and off for the past several months cold sensation she describes that when she walks rests then walks again it is the most sorts on the bottom of her feet and hurts  Worsen I would for floor  Hands tingling intermittent but not a specific fingers not any specific activities that trigger it not waking up at night she describes more of a cold sensation    Rash has been worse on her face as well as her neck feels like both acne as well as a rash somewhat itchy no new products, facial formulations, no new foods, no " new hygiene products    What's it like in one word?:                                              Facial rash with acne  How long is it ongoing: days, weeks,months?:                over the past several weeks  What makes it worse: activity? Eating? Movement? :   No known trigger  What makes it better?:                                                     Not gotten better  0/10-10/10:Pain/Intesity                                                          Sono my partners on 2018 note below MICHELL Lim  Quique Sanford is a 37 y.o. female with past medical history as below who presents today for evaluation of pruritic rash. Reports started about 1 week ago, believes symptoms started after eating tuna. Reports symptoms improving somewhat but still very itchy.  No history of food allergies in the past. Seen at outside clinic and prescribed prednisone, zyrtec. Took prednisone x 4 days, did not take zyrtec. Reports symptoms did not improve. Not using anything topically. Did try cupping with minimal relief. Had similar symptoms one year ago in April. Denies any new soaps, lotions, shampoos, or detergents. No household contacts with similar symptoms. Feeling well overall otherwise. No CP or SOB. No fevers, chills, nausea, vomiting or diarrhea.    troublesome      Any associated Sx to above complaint:  above    Any other Problems in order of Priority:        SUBJECTIVE:  Quique Sanford is a 38 y.o. female    Past Medical History:   Diagnosis Date     Anemia      Cholelithiasis      Migraine      Vitamin D deficiency      Past Surgical History:   Procedure Laterality Date      SECTION       CHOLECYSTECTOMY       Patient has no known allergies.  Current Outpatient Medications   Medication Sig Dispense Refill     citalopram (CELEXA) 10 MG tablet Take 1 tablet (10 mg total) by mouth daily. For anxiety. 30 tablet 1     naproxen (NAPROSYN) 375 MG tablet Take 1 tablet (375 mg total) by mouth 2 (two) times a day with meals. 28 tablet  "0     desonide (DESOWEN) 0.05 % cream Apply to skin in the a.m. for rash for up to 2 weeks 30 g 0     tretinoin (RETIN-A) 0.025 % cream Apply topically at bedtime. Thin film to face and neck may dry skin 45 g 1     No current facility-administered medications for this visit.      Family History   Problem Relation Age of Onset     No Medical Problems Mother      No Medical Problems Father      Social History     Socioeconomic History     Marital status: Single     Spouse name: Not on file     Number of children: Not on file     Years of education: Not on file     Highest education level: Not on file   Social Needs     Financial resource strain: Not on file     Food insecurity - worry: Not on file     Food insecurity - inability: Not on file     Transportation needs - medical: Not on file     Transportation needs - non-medical: Not on file   Occupational History     Not on file   Tobacco Use     Smoking status: Never Smoker     Smokeless tobacco: Never Used   Substance and Sexual Activity     Alcohol use: No     Drug use: No     Sexual activity: Yes     Partners: Male     Birth control/protection: None   Other Topics Concern     Not on file   Social History Narrative     Not on file     Patient Active Problem List   Diagnosis      Delivery     Anemia     Common Migraine (Without Aura)     Vitamin D Deficiency      (vaginal birth after )                                              SOCIAL: She  reports that  has never smoked. she has never used smokeless tobacco. She reports that she does not drink alcohol or use drugs.    REVIEW OF SYSTEMS:   Family history not pertinent to chief complaint or presenting problem    Review of systems otherwise negative as requested from patient, except   Those positive ROS outlined and discussed in [place].    OBJECTIVE:  BP 92/42 (Patient Site: Left Arm, Patient Position: Sitting, Cuff Size: Adult Regular)   Ht 4' 10\" (1.473 m)   Wt 118 lb (53.5 kg)   LMP 2018 "   BMI 24.66 kg/m      GENERAL:     No acute distress.   Alert and oriented X 3         Physical:    She will rash raised bumps some blackheads and whiteheads scattered throughout the forehead just in front of the ears in the  As well as the perioral area  Keratosis pilaris of the arms  Legs not bothered  Phalen sign negative  Tinel sign negative  Intrinsic muscle hand strength normal  Mild tenderness to palpation across the epicondyles  Tenderness to palpation across the plantar fascia reproduces pain  Normal distal pulses  Lower extremity edema  Nontender calves nontender shins      ASSESSMENT & PLAN      Quique was seen today for tingling.    Diagnoses and all orders for this visit:    Perioral dermatitis  -     Ambulatory referral to Dermatology    Dermatitis  -     Ambulatory referral to Dermatology    Acne, unspecified acne type  -     Ambulatory referral to Dermatology    Medial epicondylitis of elbow, unspecified laterality    Plantar fasciitis    Other orders  -     minocycline (MINOCIN,DYNACIN) 100 MG capsule; Take 1 capsule (100 mg total) by mouth 2 (two) times a day for 21 days.  -     tretinoin (RETIN-A) 0.025 % cream; Apply topically at bedtime. Thin film to face and neck may dry skin  -     desonide (DESOWEN) 0.05 % cream; Apply to skin in the a.m. for rash for up to 2 weeks        No Follow-up on file.       Anticipatory Guidance and Symptomatic Cares Discussed   Advised to call back directly if there are further questions, or if these symptoms fail to improve as anticipated or worsen.  Return to clinic if patient has a clinical concern that warrants an exam.        25  Min Total Time, > 50% counseling and coordination of Care    Robert Chandra MD  Family Medicine   [facility] [address]  [phone]

## 2021-06-20 NOTE — LETTER
Letter by Robert Chandra MD at      Author: Robert Chandra MD Service: -- Author Type: --    Filed:  Encounter Date: 2/12/2020 Status: (Other)         Quique Sanford  [address] [place] [address]             February 12, 2020         Dear Ms. Sanford,    Below are the results from your recent visit:    Resulted Orders   LipoFit by NMR   Result Value Ref Range    Total Cholesterol 164 <=199 mg/dL    Triglycerides 87 30 - 149 mg/dL    HDL Cholesterol 48 40 - 59 mg/dL    LDL Cholesterol, Calc 99 <=129 mg/dL    HDL Particle Size, NMR 9.0 >=8.9 nm      Comment:      INTERPRETIVE INFORMATION: HDL Particle Size, NMR    Percentiles in Reference Population:    25th       50th       75th    8.6        8.9        9.3    VLDL Particle Size, NMR 46.9 (H) <=46.7 nm      Comment:      INTERPRETIVE INFORMATION: VLDL Particle Size, NMR    Percentiles in Reference Population:    25th       50th       75th    44.3       46.7       50.2    LDL Particle Size, NMR 20.9 >=20.7 nm      Comment:      INTERPRETIVE INFORMATION: LDL Particle Size, NMR    Percentiles in Reference Population:    25th       50th       75th    19.6       20.7       22.5    Large HDL Particle Number, NMR 5.3 >=4.2 umol/L      Comment:      INTERPRETIVE INFORMATION: Large HDL Particle Number, NMR    Percentiles in Reference Population:    25th       50th       75th    2.0        4.2        7.3    HDL Particle Number, NMR 28.8 (L) >=33.0 umol/L      Comment:      INTERPRETIVE INFORMATION: HDL Particle Number, NMR    Percentiles in Reference Population:    25th       50th       75th    29.7       33.0       36.8    Large VLDL Particle Number, NMR 2.8 (H) <=2.7 nmol/L      Comment:      INTERPRETIVE INFORMATION: Large VLDL Particle Number, NMR    Percentiles in Reference Population:   25th       50th       75th   0.9        2.7        7.0    Small LDL Particle Number,  <=634 nmol/L      Comment:      INTERPRETIVE INFORMATION: Small LDL Particle Number,  NMR    Percentiles in Reference Population:   25th       50th       75th   220        634        949    LDL Particle Number, NMR 1167 (H) <=1135 nmol/L      Comment:      REFERENCE INTERVAL: LDL Particle Number, NMR      Low............... Less than 1136    Moderate.......... 1136 - 1449    Borderline High... 1450 - 1764    High.............. 1765 - 2186    Very High......... Greater than 2186      Percentiles in Reference Population:    20th       50th       80th       95th    1136       1450       1765      2186    Percentiles consistent with those from NCEP ATP III LDL-C  cutpoints of 100 mg/dL(20th percentile) and 160 mg/dL  (80th percentile).    EER LipoFit by NMR See Note       Comment:      Access CMP.LY Enhanced Report using either link below:     -Direct access: [email or web address]     -Enter Username, Password: [email or web address]   Username: [email or web address]   Password: [email or web address]  INTERPRETIVE INFORMATION: LipoFit by NMR    See Compliance Statement B: [employer]/  Performed by [facility] [facility],  [address] [place] [address] [phone]  [email or web address], Josr Monteiro MD, Lab. Director   Vitamin D, Total (25-Hydroxy)   Result Value Ref Range    Vitamin D, Total (25-Hydroxy) 19.6 (L) 30.0 - 80.0 ng/mL    Narrative    Deficiency <10.0 ng/mL  Insufficiency 10.0-29.9 ng/mL  Sufficiency 30.0-80.0 ng/mL  Toxicity (possible) >100.0 ng/mL   C -Reactive Protein, High Sensitivity   Result Value Ref Range    CRP, High Sensitivity 3.5 (H) 0.0 - 3.0 mg/L    Narrative    low risk < 1.0 mg/l  average risk 1.0 to 3.0 mg/l  hi risk > 3.0 mg/l  acute inflamation > 10.0 mg/l         Vitamin B12   Result Value Ref Range    Vitamin B-12 338 213 - 816 pg/mL   Thyroid Cascade   Result Value Ref Range    TSH 1.08 0.30 - 5.00 uIU/mL   Thyroid Peroxidase Antibody   Result Value Ref Range    Thyroid Peroxidase Ab <3.0 0.0 - 5.6 IU/mL       Youa these show excellent Cholesterol    Vitamin D and B12 LOW  Take  even more Vitamin D 28886 IU weekly with a fatty food  Do a B100 Vitamin Daily  More Eggs and Greens!    DanL    Please call with questions or contact us using [employer].    Sincerely,        Electronically signed by Robert Chandra MD

## 2021-06-20 NOTE — LETTER
Letter by Carline Ocampo CNP at      Author: Carline Ocampo CNP Service: -- Author Type: --    Filed:  Encounter Date: 6/19/2020 Status: (Other)         June 19, 2020     Patient: Quique Sanford   YOB: 1980   Date of Visit: 6/19/2020       To Whom It May Concern:    It is my medical opinion that Quqiue Sanford should remain out of work until negative COVID-19 testing and no symptoms by 6/26/2020.    If you have any questions or concerns, please don't hesitate to call.    Sincerely,        Electronically signed by Carline Ocampo CNP

## 2021-06-20 NOTE — LETTER
"Letter by Isamar Garcia, RN at      Author: Isamar Garcia, RN Service: -- Author Type: --    Filed:  Encounter Date: 6/20/2020 Status: (Other)       6/20/2020        Quique [address] [place] [address]    This letter provides a written record that you were tested for COVID-19 on 6/19/20.     Daim ntawv no yog fox tseg qhia tias koj tau henry kuaj tus kab mob COVID-19 lawm. SaibDate of Test (Hnub Vicente Ntsuam Sim) sab saum no.     Tau kuaj pom tias koj tsis muaj mob. Qhov no txhais tias peb nrhiav tsis pom tus kab mob uas tsim ua compa muaj mob COVID-19 thaum kuaj koj. Muaj radu thaum henry kuaj yuav qhia tias tsis muaj mob tabsis qhov tseeb tiag muaj tus kab mob lawm. Qhov no tshwm sim tau thaum uas tus kab mob tau nyuam qhuav kis tshiab lawm xwb, ua ntej koj yuav hnov muaj mob.    Yog tias koj muaj mob   Nyob twjywm hauv tsev thiab nyob kom nrug new ntawm lwm tus (cais nyob ib leeg) kom txog thaum koj ua tau raws li cov jaimie qhia TXHUA qhov hauv qab no:      Koj tsis ua npaws--thiab tsis tas yuav noj tshuaj los txo qhov ua npaws--tau 3 hnub txwm nkaus (72 teev). Thiab ?    Koj lwm simona mob khees zog lawm. Piv txwv, koj qhov hnoos lossis henry ua pa nyuaj khees lawm. Thiab?    Yam tsawg kawg nkaus yog 10 hnub lawm txij thaum koj tau pib mob.    Ncua sijhawm no:    Nyob twjywm ntawm tsev xwb. Tsis txhob mus haujlwm, mus kawm ntawv lossis mus qhovtwg milla.     Nyob twjywm hauv koj lub chav, txawm yuav noj mov los. Koj ib leeg nkaus xwb siv koj ib lub chav karen yog ua tau.    Nyob kom nrug new ntawm lwm tus hauv koj tsev. Tsis txhob khawm, hnia lossis tuav eben. Tsis pub muaj qhua tuaj xyuas.    Tu cov chaw uas \"kov heev\" alexandra loyola (cov pob liaj qhov rooj, cov qaum rooj, cov eben tuav, thiab lwm yam.). Siv cov tshuaj tu tsev lossis radu ntaub so tov tshuaj ntxuav. Koj yuav pom cov tshuaj yus siv tau tag nrho li ntawm EPA lub vej xaij nyob ntawm " www.epa.gov/pesticide-registration/list-n-disinfectants-use-against-sars-cov-2.    Siv daim ntaub npog qhov ncauj qhov ntswg, ntawv this-suj lossis ntaub so los npog koj lub qhov ncauj thiab qhov ntswg ana kom txhob kis kab mob tawm mus.    Muab karen thiab xab npum ntxuav koj ob txhais eben tas li.    Yog yuav rov mus haujlwm  Nug koj tus lospav haujlwm luz marina puas muaj henry taw qhia yog yuav rov mus haujlwm.  Cov lospav haujlwm: Daim ntawv no yog ua daim ntawv ceeb toom ntawm kws ezequiel mob qhia raws kevcai tias koj tus neeg ntiav ua haujlwm raug kuaj pom tias tsis mob COVID-19 lawm, raws li hnub kuaj fox saum tor no.    English Translation    This letter provides a written record that you were tested for COVID-19. See Date of Test (Date of Test) above.     Your result was negative. This means that we didnt find the virus that causes COVID-19 in your sample. A test may show negative when you do actually have the virus. This can happen when the virus is in the early stages of infection, before you feel illness symptoms.    If you have symptoms   Stay home and away from others (self-isolate) until you meet ALL of the guidelines below:      Youve had no fever--and no medicine that reduces fever--for 3 full days (72 hours). And ?    Your other symptoms have gotten better. For example, your cough or breathing has improved. And?    At least 10 days have passed since your symptoms started.    During this time:    Stay home. Dont go to work, school or anywhere else.     Stay in your own room, including for meals. Use your own bathroom if you can.    Stay away from others in your home. No hugging, kissing or shaking hands. No visitors.    Clean high touch surfaces often (doorknobs, counters, handles, etc.). Use a household cleaning spray or wipes. You can find a full list on the EPA website at www.epa.gov/pesticide-registration/list-n-disinfectants-use-against-sars-cov-2.    Cover your mouth and nose with a mask, tissue or washcloth  to avoid spreading germs.    Wash your hands and face often with soap and water.    Going back to work  Check with your employer for any guidelines to follow for going back to work.    Employers: This document serves as formal notice that your employee tested negative for COVID-19, as of the testing date shown above.

## 2021-06-21 NOTE — LETTER
Letter by Robert Chandra MD at      Author: Robert Chandra MD Service: -- Author Type: --    Filed:  Encounter Date: 1/16/2021 Status: (Other)         Quique Sanford  [address] [place] [address]             January 16, 2021         Dear Ms. Sanford,    Below are the results from your recent visit:    Resulted Orders   Thyroid Cascade   Result Value Ref Range    TSH 0.94 0.30 - 5.00 uIU/mL   Comprehensive Metabolic Panel   Result Value Ref Range    Sodium 141 136 - 145 mmol/L    Potassium 3.8 3.5 - 5.0 mmol/L    Chloride 107 98 - 107 mmol/L    CO2 24 22 - 31 mmol/L    Anion Gap, Calculation 10 5 - 18 mmol/L    Glucose 68 (L) 70 - 125 mg/dL    BUN 8 8 - 22 mg/dL    Creatinine 0.80 0.60 - 1.10 mg/dL    GFR MDRD Af Amer >60 >60 mL/min/1.73m2    GFR MDRD Non Af Amer >60 >60 mL/min/1.73m2    Bilirubin, Total 0.6 0.0 - 1.0 mg/dL    Calcium 9.4 8.5 - 10.5 mg/dL    Protein, Total 7.6 6.0 - 8.0 g/dL    Albumin 3.7 3.5 - 5.0 g/dL    Alkaline Phosphatase 51 45 - 120 U/L    AST 16 0 - 40 U/L    ALT 17 0 - 45 U/L    Narrative    Fasting Glucose reference range is 70-99 mg/dL per  American Diabetes Association (ADA) guidelines.   HM1 (CBC with Diff)   Result Value Ref Range    WBC 6.5 4.0 - 11.0 thou/uL    RBC 4.76 3.80 - 5.40 mill/uL    Hemoglobin 13.4 12.0 - 16.0 g/dL    Hematocrit 40.4 35.0 - 47.0 %    MCV 85 80 - 100 fL    MCH 28.2 27.0 - 34.0 pg    MCHC 33.3 32.0 - 36.0 g/dL    RDW 13.6 11.0 - 14.5 %    Platelets 247 140 - 440 thou/uL    MPV 7.7 7.0 - 10.0 fL    Neutrophils % 61 50 - 70 %    Lymphocytes % 28 20 - 40 %    Monocytes % 8 2 - 10 %    Eosinophils % 2 0 - 6 %    Basophils % 1 0 - 2 %    Neutrophils Absolute 4.0 2.0 - 7.7 thou/uL    Lymphocytes Absolute 1.8 0.8 - 4.4 thou/uL    Monocytes Absolute 0.6 0.0 - 0.9 thou/uL    Eosinophils Absolute 0.2 0.0 - 0.4 thou/uL    Basophils Absolute 0.0 0.0 - 0.2 thou/uL       Nodule 1: 1.4 x 1.3 x 1.5 cm superior aspect left lobe.   Composition: Solid or almost completely  solid, 2 points   Echogenicity: Hypoechoic, 2 points   Shape: Not taller than wide, 0 points   Margin: Ill-defined, 0 points   Echogenic Foci: Punctate echoic foci, 3 points   Point Total: 7 points or more. TI-RADS 5. If 1 cm or larger, recommend FNA; if 0.5 cm or larger, follow up US annually for 5 years.      Nodule 2: 1.2 x 0.8 x 1.2 cm right aspect of the isthmus.  Composition: Solid or almost completely solid, 2 points   Echogenicity: Hypoechoic, 2 points   Shape: Not taller than wide, 0 points   Margin: Ill-defined, 0 points   Echogenic Foci: None, or large comet-tail artifacts, 0 points   Point Total: 4-6 points. TI-RADS 4. If 1.5 cm or larger, recommend FNA; if 1 cm or larger, follow up US (annually for 5 years).     IMPRESSION:   1.  Two dominant nodules one in the superior aspect of the left lobe is category TI-RADS 5 and ultrasound-guided FNA biopsy is recommended.  2.  Second nodule in the right aspect of the isthmus is TI-RADS 4 lesion but less than 1.5 cm and follow-up ultrasound in one year is recommended.    Youa these are all OK     Not sure what has triggered your hives.    The Thyroid nodules    One has a recommendation for a biopsy to exclude abnormal Tissue    I will send in a referral to [facility] to manage this     One of our staff may reach out to assist in scheduling this.    DanL    Please call with questions or contact us using [employer].    Sincerely,        Electronically signed by Robert Chandra MD

## 2021-06-23 NOTE — PATIENT INSTRUCTIONS - HE
See Allergy       7 Minute Workout   [email or web address]      Take Pictures of rash  Document all symptoms  Write down all foods ingested last 24 hours prior to rash  Try to be seen same day

## 2021-06-23 NOTE — PROGRESS NOTES
ASSESSMENT & PLAN    No problem-specific Assessment & Plan notes found for this encounter.      Quique was seen today for follow-up.    Diagnoses and all orders for this visit:    Allergic reaction, sequela  -     Ambulatory referral to Allergy    Other orders  -     Influenza, Seasonal Quad, Preservative Free 36+ Months  -     cetirizine (ZYRTEC) 10 MG tablet; Take 1 tablet (10 mg total) by mouth daily. As needed for allergy reaction        Patient Instructions   See Allergy       7 Minute Workout   [email or web address]      Take Pictures of rash  Document all symptoms  Write down all foods ingested last 24 hours prior to rash  Try to be seen same day         Return in about 3 months (around 4/17/2019).            CHIEF COMPLAINT: Quique Sanford had concerns including Follow-up (f/u pt lab results, concerns from last visit and flu shot ).    [place]: 1.............. had concerns including Follow-up (f/u pt lab results, concerns from last visit and flu shot ).    1. Allergic reaction, sequela          CC:             Why are you here today?                      Follow up    Blood Work Review       Had a rash after food?  Feel it but could not see it?      [employer]   Helped        Today follow-up had an allergic reaction not clear if it was because of what she ingested  She describes it occurs all over her body starts out she has a little bit of tingling in the tongue no stuffy nose no stomachache no sweating no breathing troubles and then she and she feels like she itches from within    Last November she had a rash that broke out all over her body    Hemoglobin was 12 white count 6.9 TSH 1.82 sodium 140 potassium 4.3 bicarb 20 chloride 108 BUN 11 creatinine 0.66 glucose 78 B12 309 ESR 13    No other concerns due for Pap smear in the next year        No allergies in the family other than her recent symptoms  Any other Problems in order of Priority:        SUBJECTIVE:  Quique Sanford is a 38  y.o. female    Past Medical History:   Diagnosis Date     Anemia      Cholelithiasis      Migraine      Vitamin D deficiency      Past Surgical History:   Procedure Laterality Date      SECTION       CHOLECYSTECTOMY       Patient has no known allergies.  Current Outpatient Medications   Medication Sig Dispense Refill     naproxen (NAPROSYN) 375 MG tablet Take 1 tablet (375 mg total) by mouth 2 (two) times a day with meals. 28 tablet 0     cetirizine (ZYRTEC) 10 MG tablet Take 1 tablet (10 mg total) by mouth daily. As needed for allergy reaction 30 tablet 3     No current facility-administered medications for this visit.      Family History   Problem Relation Age of Onset     No Medical Problems Mother      No Medical Problems Father      Social History     Socioeconomic History     Marital status: Single     Spouse name: None     Number of children: None     Years of education: None     Highest education level: None   Social Needs     Financial resource strain: None     Food insecurity - worry: None     Food insecurity - inability: None     Transportation needs - medical: None     Transportation needs - non-medical: None   Occupational History     None   Tobacco Use     Smoking status: Never Smoker     Smokeless tobacco: Never Used   Substance and Sexual Activity     Alcohol use: No     Drug use: No     Sexual activity: Yes     Partners: Male     Birth control/protection: None   Other Topics Concern     None   Social History Narrative     None     Patient Active Problem List   Diagnosis      Delivery     Anemia     Common Migraine (Without Aura)     Vitamin D Deficiency      (vaginal birth after )                                              SOCIAL: She  reports that  has never smoked. she has never used smokeless tobacco. She reports that she does not drink alcohol or use drugs.    REVIEW OF SYSTEMS:   Family history not pertinent to chief complaint or presenting problem    Review of  Systems:      Nervous System:  No headache, paresthesia or dizziness or fainting                                  Ears: No hearing loss or ringing in the ears    Eyes: No blurring of vision, Double Vision            No redness, itching or dryness.    Nose: No nosebleed or loss of smell    Mouth: No mouth sores or  coated tongue    Throat: No hoarseness or difficulty swallowing    Neck: No enlarged thyroid or lymph nodes.    Heart: No chest pain, palpitation or irregular heartbeat.                  Lungs: No shortness of breath, wheezing or hemoptysis.    Gastrointestinal: No nausea or vomiting, melena or blood in stools.    Kidney/Bladdr: No polyuria, polydipsia, or hematuria.                             Genital/Sexual: No Sex function Changes                                Skin: Currently no rash, itchy     Muscles/Joints/Bones: No Muscle morning stiffness, No Effusion of a Joint     Review of systems otherwise negative as requested from patient, except   Those positive ROS outlined and discussed in [place].    OBJECTIVE:  BP 92/62 (Patient Site: Right Arm, Patient Position: Sitting, Cuff Size: Adult Regular)   Pulse 83   Resp 16   Wt 125 lb (56.7 kg)   LMP 01/11/2019   SpO2 98%   Breastfeeding? No   BMI 26.13 kg/m      GENERAL:     No acute distress.   Alert and oriented X 3         Physical:    Sclera clear  TMs are clear  His mucosa is noncongested  Oropharynx is clear she has one dental amalgam  No cervical or subclavicular nodes thyroid palpable no appreciable nodules  Abdomen soft flat nontender with positive bowel sounds full femoral pulses no rash no edema excellent distal pulses      ASSESSMENT & PLAN      Quique was seen today for follow-up.    Diagnoses and all orders for this visit:    Allergic reaction, sequela  -     Ambulatory referral to Allergy    Other orders  -     Influenza, Seasonal Quad, Preservative Free 36+ Months  -     cetirizine (ZYRTEC) 10 MG tablet; Take 1 tablet (10 mg total) by  mouth daily. As needed for allergy reaction        Return in about 3 months (around 4/17/2019).       Anticipatory Guidance and Symptomatic Cares Discussed   Advised to call back directly if there are further questions, or if these symptoms fail to improve as anticipated or worsen.  Return to clinic if patient has a clinical concern that warrants an exam.         I spenT 25 minutes with this patient face to face, of which 50% or greater was spent in counseling and coordination of care with regards to Youa was seen today for follow-up.    Diagnoses and all orders for this visit:    Allergic reaction, sequela  -     Ambulatory referral to Allergy    Other orders  -     Influenza, Seasonal Quad, Preservative Free 36+ Months  -     cetirizine (ZYRTEC) 10 MG tablet; Take 1 tablet (10 mg total) by mouth daily. As needed for allergy reaction        Robert Chandra MD  Family Medicine   [facility] [address] [phone]

## 2021-06-29 NOTE — PROGRESS NOTES
Progress Notes by Carline Ocampo CNP at 2020 10:30 AM     Author: Carline Ocampo CNP Service: -- Author Type: Nurse Practitioner    Filed: 2020  1:12 PM Encounter Date: 2020 Status: Signed    : Carline Ocampo CNP (Nurse Practitioner)       Chief Complaint   Patient presents with   ? Sore Throat     COVID testing       HPI: Quique Sanford is a 40 y.o. female who presents today complaining of concern for COVID-19 symptoms. Patient reports having persistent sore throat for one week. Patient reports family at home with similar symptoms. Reports taking tylenol OTC medications with minimal relief. Patient reports that the length of time of persistent sore throat prompted evaluation. Rates sore throat a 3-4/10 on the numeric pain scale. 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 Patient reports a history of seasonal allergies, not currently taking any OTC medications.     History obtained from the patient and telephone .  LMP: 2020, approximate      Problem List:  2014: Pregnancy  2014:  (vaginal birth after )   delivery delivered  Anemia  Common Migraine (Without Aura)  Vitamin D Deficiency      Past Medical History:   Diagnosis Date   ? Anemia    ? Cholelithiasis    ? Migraine    ? Vitamin D deficiency        Social History     Tobacco Use   ? Smoking status: Never Smoker   ? Smokeless tobacco: Never Used   Substance Use Topics   ? Alcohol use: No       Review of Systems   Constitutional: Negative for activity change, appetite change, chills, fatigue and fever.   HENT: Positive for sore throat. Negative for congestion, ear pain, rhinorrhea and trouble swallowing.    Respiratory: Negative for cough, shortness of breath and wheezing.    Gastrointestinal: Negative for abdominal pain, diarrhea, nausea and vomiting.   Genitourinary: Negative for dysuria.   Musculoskeletal: Negative for myalgias.   Skin: Negative for rash.    Neurological: Negative for headaches.   All other systems reviewed and are negative.      Vitals:    06/19/20 1259   BP: 118/79   Patient Position: Sitting   Pulse: 67   Resp: 18   Temp: 98.2  F (36.8  C)   SpO2: 98%       Patient evaluated with full PPE throughout entire exam, due to COVID-19 pandemic    Physical Exam  Constitutional:       Appearance: Normal appearance. She is not ill-appearing or diaphoretic.   HENT:      Head: Normocephalic and atraumatic.      Right Ear: Tympanic membrane, ear canal and external ear normal.      Left Ear: Tympanic membrane, ear canal and external ear normal.      Nose: Nose normal. No congestion or rhinorrhea.      Mouth/Throat:      Mouth: Mucous membranes are moist.      Pharynx: Posterior oropharyngeal erythema present. No oropharyngeal exudate.   Eyes:      General: No scleral icterus.        Right eye: No discharge.         Left eye: No discharge.      Extraocular Movements: Extraocular movements intact.      Conjunctiva/sclera: Conjunctivae normal.      Pupils: Pupils are equal, round, and reactive to light.   Neck:      Musculoskeletal: Neck supple.   Cardiovascular:      Rate and Rhythm: Normal rate and regular rhythm.      Pulses: Normal pulses.      Heart sounds: Normal heart sounds.   Pulmonary:      Effort: Pulmonary effort is normal.      Breath sounds: Normal breath sounds.   Lymphadenopathy:      Cervical: Cervical adenopathy present.   Skin:     General: Skin is warm.      Capillary Refill: Capillary refill takes less than 2 seconds.      Coloration: Skin is not pale.      Findings: No rash.   Neurological:      General: No focal deficit present.      Mental Status: She is alert and oriented to person, place, and time. Mental status is at baseline.   Psychiatric:         Mood and Affect: Mood normal.         Behavior: Behavior normal.         No notes on file    Labs:  Recent Results (from the past 72 hour(s))   Rapid Strep A Screen-Throat    Specimen: Throat    Result Value Ref Range    Rapid Strep A Antigen Group A Strep detected (!) No Group A Strep detected, presumptive negative       Radiology: None obtained    Clinical Decision Making: At the end of the encounter, I discussed results, diagnosis, medications. Discussed red flags with strep pharyngitis in the setting of possible COVID-19 exposures. or immediate return to clinic/ER, as well as follow up if no improvement. Encouraged full completion of antibiotic course and symptomatic cares. Given known exposures to COVID at patient's  place of employment, should still proceed with COVID-19 screening as scheduled later today. Patient understood and agreed to plan.    GABBI Rider, CNP       1. Throat pain  Symptomatic COVID-19 Virus (CORONAVIRUS) PCR    COVID-19 Virus PCR MRF    Rapid Strep A Screen-Throat   2. Streptococcal sore throat  amoxicillin (AMOXIL) 500 MG tablet         Patient Instructions       Patient Education     [employer] Video Sheets  Symptoms of COVID-19 Infection  You're not feeling well. You're worried you may be infected with the COVID-19 virus. But what are the signs? Here's what to look for.  To watch the video:  Scan the QR code  Using your mobile device, scan the following code:       OR  Go to the website:  www.[employer]  Enter the prescription code:   RQ9     2020 [employer].           Patient Education     Pharyngitis: Strep (Presumed)    You have pharyngitis (sore throat). The healthcare staff think your sore throat is caused by streptococcus (strep) bacteria. This is often called strep throat. Strep throat can cause throat pain that is worse when swallowing, aching all over, headache, and fever. The infection is contagious. It may be spread by coughing, kissing, or touching others after touching your mouth or nose. Antibiotic medicine is given to treat the infection.  Home care    Rest at home. Drink plenty of fluids so you wont get dehydrated.    Stay  home from work or school for the first 2 days of taking the antibiotics. After this time, you will not be contagious. You can then return to work or school if you are feeling better.     Take the antibiotic medicine for the full 10 days, even when you feel better. This is very important to make sure the infection is fully treated. It is also important to prevent medicine-resistant germs from growing. If you were given an antibiotic shot, no more antibiotics are needed.    You may use acetaminophen or ibuprofen to control pain or fever, unless another medicine was prescribed for this. If you have chronic liver or kidney disease or ever had a stomach ulcer or GI bleeding, talk with your healthcare provider before using these medicines.    Use throat lozenges or a throat-numbing spray to help reduce throat pain. Gargling with warm salt water can also help reduce throat pain. Dissolve 1/2 teaspoon of salt in 1 glass of warm water.     Dont eat salty or spicy foods. These can irritate the throat.  Follow-up care  Follow up with your healthcare provider or our staff if you don't get better over the next week.  When to seek medical advice  Call your healthcare provider right away if any of these occur:    Fever as directed by your healthcare provider    New or worse ear pain, sinus pain, or headache    Painful lumps in the back of neck    Stiff neck    Lymph nodes that get larger    Cant swallow liquids, a lot of drooling, or cant open mouth wide due to throat pain    Signs of dehydration, such as very dark urine or no urine, sunken eyes, dizziness    Trouble breathing or noisy breathing    Muffled voice    New rash  Prevention  Here are steps you can take to help prevent an infection:    Keep good hand washing habits.    Dont have close contact with people who have sore throats, colds, or other upper respiratory infections.    Dont smoke, and stay away from secondhand smoke.    Stay up to date with of your vaccines.  Date  Last Reviewed: 11/1/2017 2000-2017 [employer]. [address] [place] [address], [place] [address]. All rights reserved. This information is not intended as a substitute for professional medical care. Always follow your healthcare professional's instructions.

## 2021-07-03 NOTE — ADDENDUM NOTE
Addendum Note by Selena Eng at 1/31/2020 10:20 AM     Author: Selena Eng Service: -- Author Type:     Filed: 2/3/2020  5:17 PM Encounter Date: 1/31/2020 Status: Signed    : Selena Eng ()    Addended by: SELENA ENG on: 2/3/2020 05:17 PM        Modules accepted: Orders

## 2021-11-05 ENCOUNTER — ALLIED HEALTH/NURSE VISIT (OUTPATIENT)
Dept: FAMILY MEDICINE | Facility: CLINIC | Age: 41
End: 2021-11-05
Payer: COMMERCIAL

## 2021-11-05 DIAGNOSIS — Z23 NEED FOR VACCINATION: Primary | ICD-10-CM

## 2021-11-05 PROCEDURE — 0004A PR COVID VAC PFIZER DIL RECON 30 MCG/0.3 ML IM: CPT

## 2021-11-05 PROCEDURE — 91300 PR COVID VAC PFIZER DIL RECON 30 MCG/0.3 ML IM: CPT

## 2021-11-19 ENCOUNTER — OFFICE VISIT (OUTPATIENT)
Dept: FAMILY MEDICINE | Facility: CLINIC | Age: 41
End: 2021-11-19
Payer: COMMERCIAL

## 2021-11-19 VITALS
DIASTOLIC BLOOD PRESSURE: 73 MMHG | WEIGHT: 127.8 LBS | OXYGEN SATURATION: 98 % | SYSTOLIC BLOOD PRESSURE: 109 MMHG | HEART RATE: 75 BPM | BODY MASS INDEX: 26.47 KG/M2 | TEMPERATURE: 97.3 F

## 2021-11-19 DIAGNOSIS — J02.0 STREPTOCOCCAL SORE THROAT: ICD-10-CM

## 2021-11-19 DIAGNOSIS — R05.9 COUGH: Primary | ICD-10-CM

## 2021-11-19 LAB
DEPRECATED S PYO AG THROAT QL EIA: NEGATIVE
FLUAV AG SPEC QL IA: NEGATIVE
FLUBV AG SPEC QL IA: NEGATIVE

## 2021-11-19 PROCEDURE — 87651 STREP A DNA AMP PROBE: CPT | Performed by: STUDENT IN AN ORGANIZED HEALTH CARE EDUCATION/TRAINING PROGRAM

## 2021-11-19 PROCEDURE — 87804 INFLUENZA ASSAY W/OPTIC: CPT | Performed by: STUDENT IN AN ORGANIZED HEALTH CARE EDUCATION/TRAINING PROGRAM

## 2021-11-19 PROCEDURE — U0005 INFEC AGEN DETEC AMPLI PROBE: HCPCS | Performed by: STUDENT IN AN ORGANIZED HEALTH CARE EDUCATION/TRAINING PROGRAM

## 2021-11-19 PROCEDURE — U0003 INFECTIOUS AGENT DETECTION BY NUCLEIC ACID (DNA OR RNA); SEVERE ACUTE RESPIRATORY SYNDROME CORONAVIRUS 2 (SARS-COV-2) (CORONAVIRUS DISEASE [COVID-19]), AMPLIFIED PROBE TECHNIQUE, MAKING USE OF HIGH THROUGHPUT TECHNOLOGIES AS DESCRIBED BY CMS-2020-01-R: HCPCS | Performed by: STUDENT IN AN ORGANIZED HEALTH CARE EDUCATION/TRAINING PROGRAM

## 2021-11-19 PROCEDURE — 99214 OFFICE O/P EST MOD 30 MIN: CPT | Performed by: STUDENT IN AN ORGANIZED HEALTH CARE EDUCATION/TRAINING PROGRAM

## 2021-11-19 RX ORDER — GUAIFENESIN/DEXTROMETHORPHAN 100-10MG/5
10 SYRUP ORAL EVERY 4 HOURS PRN
Qty: 118 ML | Refills: 0 | Status: SHIPPED | OUTPATIENT
Start: 2021-11-19 | End: 2021-12-26

## 2021-11-19 RX ORDER — GUAIFENESIN/DEXTROMETHORPHAN 100-10MG/5
10 SYRUP ORAL EVERY 4 HOURS PRN
Qty: 118 ML | Refills: 0 | Status: SHIPPED | OUTPATIENT
Start: 2021-11-19 | End: 2021-11-19

## 2021-11-19 RX ORDER — BENZONATATE 200 MG/1
200 CAPSULE ORAL 3 TIMES DAILY PRN
Qty: 30 CAPSULE | Refills: 0 | Status: SHIPPED | OUTPATIENT
Start: 2021-11-19 | End: 2021-11-19

## 2021-11-19 RX ORDER — BENZONATATE 200 MG/1
200 CAPSULE ORAL 3 TIMES DAILY PRN
Qty: 30 CAPSULE | Refills: 0 | Status: SHIPPED | OUTPATIENT
Start: 2021-11-19 | End: 2021-12-26

## 2021-11-19 NOTE — PROGRESS NOTES
SUBJECTIVE       Quique Sanford is a 41 year old  female with a PMH significant for   Patient Active Problem List   Diagnosis     Anemia     Common Migraine (Without Aura)     Vitamin D Deficiency      who presents with   Chief Complaint   Patient presents with     Nasal Congestion     itchy throat and runny nose   .  Patient endorses symptoms for the last 3 day/s. Patient is experiencing runny nose, sore throat, couging. Patient denies fever, runny nose and cough - non-productive. Patient presents with the entire family both siblings and mom and all three have the same symptoms.Their course is same.  Patient has tried Fluids, Rest and None tried. Patient is not considered high risk based on medical history. Patient denies any travel, denies exposure to persons with known travel, and denies exposure to patients with known COVID19.            REVIEW OF SYSTEMS     Head: No headache or facial pain  Neck: Yes throat pain, No swallowing problems  ENT: No ear pain and nasal congestion   Chest: No chest pain   GI: No constipation, diarrhea, no nausea or vomiting  Skin: No rash        OBJECTIVE     Vitals:    11/19/21 1736   BP: 109/73   Pulse: 75   Temp: 97.3  F (36.3  C)   SpO2: 98%   Weight: 58 kg (127 lb 12.8 oz)       Constitutional: Awake, alert, cooperative, no apparent distress, and appears stated age. Appears well hydrated  Eyes: Lids and lashes normal, sclera clear, conjunctiva normal.  ENT: Normocephalic, without obvious abnormality, atramatic, tonsils 2+ with no erythema or exudate, no lymphadenopathy    Lungs: No increased work of breathing, good air exchange, lungs clear to auscultation bilaterally, no crackles or wheezing.  Cardiovascular: Regular rate and rhythm, normal S1 and S2, no S3 or S4, and no murmur noted.  Musculoskeletal: No redness, warmth, or swelling of the joints.  Full range of motion noted.  Neurologic: Awake, alert, oriented to name, place and time.  Cranial nerves II-XII are grossly  intact.  Skin: No rash    Results for orders placed or performed in visit on 11/19/21 (from the past 24 hour(s))   Influenza A & B Antigen - Clinic Collect    Specimen: Nose; Swab   Result Value Ref Range    Influenza A antigen Negative Negative    Influenza B antigen Negative Negative    Narrative    Test results must be correlated with clinical data. If necessary, results should be confirmed by a molecular assay or viral culture.   Streptococcus A Rapid Scr w Reflx to PCR    Specimen: Throat; Swab   Result Value Ref Range    Group A Strep antigen Negative Negative       ASSESSMENT AND PLAN      Quique was seen today for nasal congestion.    Diagnoses and all orders for this visit:    Cough  -     Discontinue: guaiFENesin-dextromethorphan (ROBITUSSIN DM) 100-10 MG/5ML syrup; Take 10 mLs by mouth every 4 hours as needed for cough  -     Discontinue: phenol-menthol (CEPASTAT) 14.5 MG lozenge; Place 1 lozenge inside cheek every 2 hours as needed for moderate pain  -     Discontinue: benzonatate (TESSALON) 200 MG capsule; Take 1 capsule (200 mg) by mouth 3 times daily as needed for cough  -     Discontinue: guaiFENesin-dextromethorphan (ROBITUSSIN DM) 100-10 MG/5ML syrup; Take 10 mLs by mouth every 4 hours as needed for cough  -     Discontinue: benzonatate (TESSALON) 200 MG capsule; Take 1 capsule (200 mg) by mouth 3 times daily as needed for cough  -     Discontinue: phenol-menthol (CEPASTAT) 14.5 MG lozenge; Place 1 lozenge inside cheek every 2 hours as needed for moderate pain  -     guaiFENesin-dextromethorphan (ROBITUSSIN DM) 100-10 MG/5ML syrup; Take 10 mLs by mouth every 4 hours as needed for cough  -     phenol-menthol (CEPASTAT) 14.5 MG lozenge; Place 1 lozenge inside cheek every 2 hours as needed for moderate pain  -     benzonatate (TESSALON) 200 MG capsule; Take 1 capsule (200 mg) by mouth 3 times daily as needed for cough    Streptococcal sore throat  -     Symptomatic COVID-19 Virus (Coronavirus) by PCR  Nose  -     Influenza A & B Antigen - Clinic Collect  -     Streptococcus A Rapid Scr w Reflx to PCR  -     Group A Streptococcus PCR Throat Swab      - Patient deemed to be safe to continue supportive cares at home  - Patient provided with the following education:  1. Get lots of rest. Drink extra fluids (unless a doctor has told you not to).     2. Take Tylenol (acetaminophen) for fever or pain. If you have liver or kidney problems, ask your family doctor if it's okay to take Tylenol.     Adults can take either:     650 mg (two 325 mg pills) every 4 to 6 hours, or     1,000 mg (two 500 mg pills) every 8 hours as needed.     Note: Don't take more than 3,000 mg in one day.   Acetaminophen is found in many medicines (both prescribed and over-the-counter medicines). Read all labels to be sure you don't take too much.     Gerardo Barr MD  [facility]

## 2021-11-19 NOTE — LETTER
November 20, 2021      Quique Sanford  [address] [place] [address]        Dear ,    We are writing to inform you of your test results.        Resulted Orders   Influenza A & B Antigen - Clinic Collect   Result Value Ref Range    Influenza A antigen Negative Negative    Influenza B antigen Negative Negative    Narrative    Test results must be correlated with clinical data. If necessary, results should be confirmed by a molecular assay or viral culture.   Streptococcus A Rapid Scr w Reflx to PCR   Result Value Ref Range    Group A Strep antigen Negative Negative   Group A Streptococcus PCR Throat Swab   Result Value Ref Range    Group A strep by PCR Not Detected Not Detected    Narrative    The Xpert Xpress Strep A test, performed on the [employer]  [employer], is a rapid, qualitative in vitro diagnostic test for the detection of Streptococcus pyogenes (Group A ß-hemolytic Streptococcus, Strep A) in throat swab specimens from patients with signs and symptoms of pharyngitis. The Xpert Xpress Strep A test can be used as an aid in the diagnosis of Group A Streptococcal pharyngitis. The assay is not intended to monitor treatment for Group A Streptococcus infections. The Xpert Xpress Strep A test utilizes an automated real-time polymerase chain reaction (PCR) to detect Streptococcus pyogenes DNA.       If you have any questions or concerns, please call the clinic at the number listed above.       Sincerely,      Gerardo Barr MD

## 2021-11-19 NOTE — PATIENT INSTRUCTIONS
Instructions for Patients  Your symptoms show that you may have coronavirus (COVID-19). This illness can cause fever, cough and trouble breathing. Many people get a mild case and get better on their own. Some people can get very sick.     Not all patients are tested for COVID-19. If you need to be tested, your care team will let you know.    How can I protect others?    Without a test, we can t know for sure that you have COVID-19. For safety, it s very important to follow these rules.    Stay home and away from others (self-isolate) until:    You ve had no fever--and no medicine that reduces fever--for 1 full day (24 hours), And     Your other symptoms have resolved (gotten better). For example, your cough or breathing has improved, And     At least 10 days have passed since your symptoms started.    During this time:    Stay in your own room (and use your own bathroom), if you can.    Stay away from others in your home. No hugging, kissing or shaking hands.    No visitors.    Don t go to work, school or anywhere else.     Clean  high touch  surfaces often (doorknobs, counters, handles, etc.). Use a household cleaning spray or wipes.    Cover your mouth and nose with a mask, tissue or washcloth to avoid spreading germs.    Wash your hands and face often. Use soap and water.    For more tips, go to https://www.cdc.gov/coronavirus/2019-ncov/downloads/10Things.pdf.    How can I take care of myself?    1. Get lots of rest. Drink extra fluids (unless a doctor has told you not to).     2. Take Tylenol (acetaminophen) for fever or pain. If you have liver or kidney problems, ask your family doctor if it's okay to take Tylenol.     Adults can take either:     650 mg (two 325 mg pills) every 4 to 6 hours, or     1,000 mg (two 500 mg pills) every 8 hours as needed.     Note: Don't take more than 3,000 mg in one day.   Acetaminophen is found in many medicines (both prescribed and over-the-counter medicines). Read all labels to  be sure you don't take too much.   For children, check the Tylenol bottle for the right dose. The dose is based on  the child's age or weight.    3. If you have other health problems (like cancer, heart failure, an organ transplant or severe kidney disease): Call your specialty clinic if you don't feel better in the next 2 days.    4. Know when to call 911: If your breathing is so bad that it keeps you from doing normal activities, call 911 or go to the emergency room. Tell them that you've been staying home and may have COVID-19.      Thank you for limiting contact with others, wearing a simple mask to cover your cough, practice good hand hygiene habits and accessing our virtual services where possible to limit the spread of this virus.    For more information about COVID19 and options for caring for yourself at home, please visit the CDC website at https://www.cdc.gov/coronavirus/2019-ncov/about/steps-when-sick.html  For more options for care at [facility], please visit our website at [email or web address]

## 2021-11-20 LAB — GROUP A STREP BY PCR: NOT DETECTED

## 2021-11-21 LAB — SARS-COV-2 RNA RESP QL NAA+PROBE: NEGATIVE

## 2021-12-24 ENCOUNTER — OFFICE VISIT (OUTPATIENT)
Dept: FAMILY MEDICINE | Facility: CLINIC | Age: 41
End: 2021-12-24
Payer: COMMERCIAL

## 2021-12-24 VITALS
HEART RATE: 80 BPM | HEIGHT: 58 IN | BODY MASS INDEX: 26.97 KG/M2 | SYSTOLIC BLOOD PRESSURE: 103 MMHG | DIASTOLIC BLOOD PRESSURE: 71 MMHG | WEIGHT: 128.5 LBS | TEMPERATURE: 98.2 F

## 2021-12-24 DIAGNOSIS — E55.9 VITAMIN D DEFICIENCY: ICD-10-CM

## 2021-12-24 DIAGNOSIS — E53.8 VITAMIN B12 DEFICIENCY (NON ANEMIC): ICD-10-CM

## 2021-12-24 DIAGNOSIS — Z00.00 ADULT GENERAL MEDICAL EXAM: Primary | ICD-10-CM

## 2021-12-24 DIAGNOSIS — D64.9 ANEMIA, UNSPECIFIED TYPE: ICD-10-CM

## 2021-12-24 DIAGNOSIS — E04.1 THYROID NODULE: ICD-10-CM

## 2021-12-24 DIAGNOSIS — Z11.59 ENCOUNTER FOR HCV SCREENING TEST FOR LOW RISK PATIENT: ICD-10-CM

## 2021-12-24 LAB
ALBUMIN SERPL-MCNC: 3.7 G/DL (ref 3.5–5)
ALP SERPL-CCNC: 53 U/L (ref 45–120)
ALT SERPL W P-5'-P-CCNC: 15 U/L (ref 0–45)
ANION GAP SERPL CALCULATED.3IONS-SCNC: 11 MMOL/L (ref 5–18)
AST SERPL W P-5'-P-CCNC: 15 U/L (ref 0–40)
BILIRUB SERPL-MCNC: 0.2 MG/DL (ref 0–1)
BUN SERPL-MCNC: 10 MG/DL (ref 8–22)
CALCIUM SERPL-MCNC: 9 MG/DL (ref 8.5–10.5)
CHLORIDE BLD-SCNC: 110 MMOL/L (ref 98–107)
CHOLEST SERPL-MCNC: 157 MG/DL
CLUE CELLS: ABNORMAL
CO2 SERPL-SCNC: 20 MMOL/L (ref 22–31)
CREAT SERPL-MCNC: 0.68 MG/DL (ref 0.6–1.1)
ERYTHROCYTE [DISTWIDTH] IN BLOOD BY AUTOMATED COUNT: 13.8 % (ref 10–15)
FASTING STATUS PATIENT QL REPORTED: NO
GFR SERPL CREATININE-BSD FRML MDRD: >90 ML/MIN/1.73M2
GLUCOSE BLD-MCNC: 83 MG/DL (ref 70–125)
HCT VFR BLD AUTO: 37.9 % (ref 35–47)
HCV AB SERPL QL IA: NEGATIVE
HDLC SERPL-MCNC: 40 MG/DL
HGB BLD-MCNC: 12.1 G/DL (ref 11.7–15.7)
IRON SATN MFR SERPL: 4 % (ref 15–46)
IRON SERPL-MCNC: 20 UG/DL (ref 35–180)
LDLC SERPL CALC-MCNC: 98 MG/DL
MCH RBC QN AUTO: 27.8 PG (ref 26.5–33)
MCHC RBC AUTO-ENTMCNC: 31.9 G/DL (ref 31.5–36.5)
MCV RBC AUTO: 87 FL (ref 78–100)
PLATELET # BLD AUTO: 259 10E3/UL (ref 150–450)
POTASSIUM BLD-SCNC: 3.9 MMOL/L (ref 3.5–5)
PROT SERPL-MCNC: 7.2 G/DL (ref 6–8)
RBC # BLD AUTO: 4.35 10E6/UL (ref 3.8–5.2)
SODIUM SERPL-SCNC: 141 MMOL/L (ref 136–145)
TIBC SERPL-MCNC: 454 UG/DL (ref 240–430)
TRICHOMONAS, WET PREP: ABNORMAL
TRIGL SERPL-MCNC: 97 MG/DL
TSH SERPL DL<=0.005 MIU/L-ACNC: 1.23 UIU/ML (ref 0.3–5)
VIT B12 SERPL-MCNC: 308 PG/ML (ref 213–816)
WBC # BLD AUTO: 4.6 10E3/UL (ref 4–11)
WBC'S/HIGH POWER FIELD, WET PREP: ABNORMAL
YEAST, WET PREP: ABNORMAL

## 2021-12-24 PROCEDURE — G0123 SCREEN CERV/VAG THIN LAYER: HCPCS | Performed by: FAMILY MEDICINE

## 2021-12-24 PROCEDURE — 86803 HEPATITIS C AB TEST: CPT | Performed by: FAMILY MEDICINE

## 2021-12-24 PROCEDURE — 80053 COMPREHEN METABOLIC PANEL: CPT | Performed by: FAMILY MEDICINE

## 2021-12-24 PROCEDURE — 80061 LIPID PANEL: CPT | Performed by: FAMILY MEDICINE

## 2021-12-24 PROCEDURE — 83550 IRON BINDING TEST: CPT | Performed by: FAMILY MEDICINE

## 2021-12-24 PROCEDURE — 99396 PREV VISIT EST AGE 40-64: CPT | Performed by: FAMILY MEDICINE

## 2021-12-24 PROCEDURE — 84443 ASSAY THYROID STIM HORMONE: CPT | Performed by: FAMILY MEDICINE

## 2021-12-24 PROCEDURE — 87210 SMEAR WET MOUNT SALINE/INK: CPT | Performed by: FAMILY MEDICINE

## 2021-12-24 PROCEDURE — 36415 COLL VENOUS BLD VENIPUNCTURE: CPT | Performed by: FAMILY MEDICINE

## 2021-12-24 PROCEDURE — 82607 VITAMIN B-12: CPT | Performed by: FAMILY MEDICINE

## 2021-12-24 PROCEDURE — 85027 COMPLETE CBC AUTOMATED: CPT | Performed by: FAMILY MEDICINE

## 2021-12-24 PROCEDURE — 82306 VITAMIN D 25 HYDROXY: CPT | Performed by: FAMILY MEDICINE

## 2021-12-24 PROCEDURE — 87624 HPV HI-RISK TYP POOLED RSLT: CPT | Performed by: FAMILY MEDICINE

## 2021-12-24 RX ORDER — BENZOCAINE/MENTHOL 6 MG-10 MG
LOZENGE MUCOUS MEMBRANE
COMMUNITY
Start: 2021-02-13 | End: 2021-12-26

## 2021-12-24 ASSESSMENT — MIFFLIN-ST. JEOR: SCORE: 1143.75

## 2021-12-24 NOTE — PROGRESS NOTES
SUBJECTIVE:   CC: Quique Sanford is an 41 year old woman who presents for preventive health visit.       Patient has been advised of split billing requirements and indicates understanding: Yes  Healthy Habits:     Getting at least 3 servings of Calcium per day:  NO    Bi-annual eye exam:  Yes    Dental care twice a year:  NO    Sleep apnea or symptoms of sleep apnea:  None    Diet:  Regular (no restrictions)    Frequency of exercise:  None    Taking medications regularly:  Not Applicable    Barriers to taking medications:  Not applicable    Medication side effects:  Not applicable    PHQ-2 Total Score: 0    Additional concerns today:  Yes        Today's PHQ-2 Score:   PHQ-2 ( 1999 Pfizer) 12/24/2021   Q1: Little interest or pleasure in doing things 0   Q2: Feeling down, depressed or hopeless 0   PHQ-2 Score 0   Q1: Little interest or pleasure in doing things Not at all   Q2: Feeling down, depressed or hopeless Not at all   PHQ-2 Score 0       Abuse: Current or Past (Physical, Sexual or Emotional) - No  Do you feel safe in your environment? Yes    Have you ever done Advance Care Planning? (For example, a Health Directive, POLST, or a discussion with a medical provider or your loved ones about your wishes): No, advance care planning information given to patient to review.  Patient declined advance care planning discussion at this time.    Social History     Tobacco Use     Smoking status: Never Smoker     Smokeless tobacco: Never Used   Substance Use Topics     Alcohol use: No     If you drink alcohol do you typically have >3 drinks per day or >7 drinks per week? Not applicable    Alcohol Use 12/24/2021   Prescreen: >3 drinks/day or >7 drinks/week? Not Applicable   Prescreen: >3 drinks/day or >7 drinks/week? -   No flowsheet data found.    Reviewed orders with patient.  Reviewed health maintenance and updated orders accordingly - Yes  BP Readings from Last 3 Encounters:   12/24/21 103/71   11/19/21 109/73   07/02/21  116/79    Wt Readings from Last 3 Encounters:   21 58.3 kg (128 lb 8 oz)   21 58 kg (127 lb 12.8 oz)   21 59.6 kg (131 lb 4.8 oz)                  Patient Active Problem List   Diagnosis     Anemia     Common Migraine (Without Aura)     Vitamin D Deficiency     Past Surgical History:   Procedure Laterality Date      SECTION       CHOLECYSTECTOMY         Social History     Tobacco Use     Smoking status: Never Smoker     Smokeless tobacco: Never Used   Substance Use Topics     Alcohol use: No     Family History   Problem Relation Age of Onset     No Known Problems Mother      No Known Problems Father          No current outpatient medications on file.     No Known Allergies    Breast Cancer Screening:    Breast CA Risk Assessment (FHS-7) 2021   Do you have a family history of breast, colon, or ovarian cancer? No / Unknown         Mammogram Screening - Offered annual screening and updated Health Maintenance for [place] plan based on risk factor consideration    Pertinent mammograms are reviewed under the imaging tab.    History of abnormal Pap smear: NO - age 30-65 PAP every 5 years with negative HPV co-testing recommended     Reviewed and updated as needed this visit by clinical staff  Tobacco  Allergies  Meds             Reviewed and updated as needed this visit by Provider               Past Medical History:   Diagnosis Date     Anemia      Cholelithiasis      Migraine      Vitamin D deficiency       Past Surgical History:   Procedure Laterality Date      SECTION       CHOLECYSTECTOMY       OB History    Para Term  AB Living   6 5 5 0 1 3   SAB IAB Ectopic Multiple Live Births   1 0 0 0 3      # Outcome Date GA Lbr Miles/2nd Weight Sex Delivery Anes PTL Lv   6 Term 07/15/14 40w1d 03:57 / 00:44 2.948 kg (6 lb 8 oz) F Vag-Spont Local N [place]      Name: LORNE,FEMALE-JESUS      Apgar1: 9  Apgar5: 9   5 Term 07 39w6d   M    [place]      Name: Aaron Rose   4 Term  "06 40w6d   F    [place]      Name: Deangelo Rose   3 SAB            2 Term 02 40w0d   M          Name: Travis Rose   1 Term 00 40w0d   F CS-Classical         Birth Comments: Breech      Name: Benjy Rose       Review of Systems  CONSTITUTIONAL: NEGATIVE for fever, chills, change in weight  INTEGUMENTARU/SKIN: NEGATIVE for worrisome rashes, moles or lesions  EYES: NEGATIVE for vision changes or irritation  ENT: NEGATIVE for ear, mouth and throat problems  RESP: NEGATIVE for significant cough or SOB  BREAST: NEGATIVE for masses, tenderness or discharge  CV: NEGATIVE for chest pain, palpitations or peripheral edema  GI: NEGATIVE for nausea, abdominal pain, heartburn, or change in bowel habits  : NEGATIVE for unusual urinary or vaginal symptoms. Periods are regular.  MUSCULOSKELETAL: NEGATIVE for significant arthralgias or myalgia  NEURO: NEGATIVE for weakness, dizziness or paresthesias  PSYCHIATRIC: NEGATIVE for changes in mood or affect     OBJECTIVE:   /71   Pulse 80   Temp 98.2  F (36.8  C)   Ht 1.483 m (4' 10.39\")   Wt 58.3 kg (128 lb 8 oz)   LMP 12/15/2021 (Approximate)   BMI 26.50 kg/m    Physical Exam  Constitutional:       General: She is not in acute distress.     Appearance: She is well-developed.   HENT:      Right Ear: Tympanic membrane and external ear normal.      Left Ear: Tympanic membrane and external ear normal.      Nose: Nose normal.      Mouth/Throat:      Pharynx: No oropharyngeal exudate.   Eyes:      General:         Right eye: No discharge.         Left eye: No discharge.      Conjunctiva/sclera: Conjunctivae normal.      Pupils: Pupils are equal, round, and reactive to light.   Neck:      Thyroid: No thyromegaly.      Trachea: No tracheal deviation.      Comments: Mildly enlarged thyroid  Cardiovascular:      Rate and Rhythm: Normal rate and regular rhythm.      Pulses: Normal pulses.      Heart sounds: Normal heart sounds, S1 normal and S2 normal. " No murmur heard.  No friction rub. No S3 or S4 sounds.    Pulmonary:      Effort: Pulmonary effort is normal. No respiratory distress.      Breath sounds: Normal breath sounds. No wheezing or rales.   Chest:   Breasts:      Right: No mass, nipple discharge or tenderness.      Left: No mass, nipple discharge or tenderness.       Abdominal:      General: Bowel sounds are normal.      Palpations: Abdomen is soft. There is no mass.      Tenderness: There is no abdominal tenderness.   Genitourinary:     Comments: PELVIC EXAM:External genitalia: normal  Vaginal mucosa normal  Vaginal discharge: white  Speculum exam shows a normal appearing cervix .   Bimanual exam: Cervix closed, firm, non tender  to motion.  Uterus  firm, regular, mobile, non tender to palpation. No adnexal masses or tenderness.     Musculoskeletal:         General: Normal range of motion.      Cervical back: Neck supple.   Lymphadenopathy:      Cervical: No cervical adenopathy.   Skin:     General: Skin is warm and dry.      Findings: No rash.   Neurological:      Mental Status: She is alert and oriented to person, place, and time.      Motor: No abnormal muscle tone.      Deep Tendon Reflexes: Reflexes are normal and symmetric.   Psychiatric:         Thought Content: Thought content normal.         Judgment: Judgment normal.           Diagnostic Test Results:  Labs reviewed in Epic  Results for orders placed or performed in visit on 12/24/21   Hepatitis C antibody     Status: Normal   Result Value Ref Range    Hepatitis C Antibody Negative Negative    Narrative    Assay performance characteristics have not been established for newborns, infants, children (<18 years) or populations of immunocompromised or immunosuppressed patients.    Lipid Profile (Chol, Trig, HDL, LDL calc)     Status: Abnormal   Result Value Ref Range    Cholesterol 157 <=199 mg/dL    Triglycerides 97 <=149 mg/dL    Direct Measure HDL 40 (L) >=50 mg/dL    LDL Cholesterol Calculated  98 <=129 mg/dL    Patient Fasting > 8hrs? No    Comprehensive metabolic panel (BMP + Alb, Alk Phos, ALT, AST, Total. Bili, TP)     Status: Abnormal   Result Value Ref Range    Sodium 141 136 - 145 mmol/L    Potassium 3.9 3.5 - 5.0 mmol/L    Chloride 110 (H) 98 - 107 mmol/L    Carbon Dioxide (CO2) 20 (L) 22 - 31 mmol/L    Anion Gap 11 5 - 18 mmol/L    Urea Nitrogen 10 8 - 22 mg/dL    Creatinine 0.68 0.60 - 1.10 mg/dL    Calcium 9.0 8.5 - 10.5 mg/dL    Glucose 83 70 - 125 mg/dL    Alkaline Phosphatase 53 45 - 120 U/L    AST 15 0 - 40 U/L    ALT 15 0 - 45 U/L    Protein Total 7.2 6.0 - 8.0 g/dL    Albumin 3.7 3.5 - 5.0 g/dL    Bilirubin Total 0.2 0.0 - 1.0 mg/dL    GFR Estimate >90 >60 mL/min/1.73m2   CBC with platelets     Status: Normal   Result Value Ref Range    WBC Count 4.6 4.0 - 11.0 10e3/uL    RBC Count 4.35 3.80 - 5.20 10e6/uL    Hemoglobin 12.1 11.7 - 15.7 g/dL    Hematocrit 37.9 35.0 - 47.0 %    MCV 87 78 - 100 fL    MCH 27.8 26.5 - 33.0 pg    MCHC 31.9 31.5 - 36.5 g/dL    RDW 13.8 10.0 - 15.0 %    Platelet Count 259 150 - 450 10e3/uL   Iron and iron binding capacity     Status: Abnormal   Result Value Ref Range    Iron 20 (L) 35 - 180 ug/dL    Iron Binding Capacity 454 (H) 240 - 430 ug/dL    Iron Sat Index 4 (L) 15 - 46 %   Vitamin B12     Status: Normal   Result Value Ref Range    Vitamin B12 308 213 - 816 pg/mL   TSH     Status: Normal   Result Value Ref Range    TSH 1.23 0.30 - 5.00 uIU/mL   Vitamin D Deficiency     Status: Abnormal   Result Value Ref Range    Vitamin D, Total (25-Hydroxy) 14 (L) 30 - 80 ug/L    Narrative    Deficiency <10.0 ug/L  Insufficiency 10.0-29.9 ug/L  Sufficiency 30.0-80.0 ug/L  Toxicity (possible) >100.0 ug/L    Wet prep - Clinic Collect     Status: Abnormal    Specimen: Vagina; Swab   Result Value Ref Range    Trichomonas Absent Absent    Yeast Absent Absent    Clue Cells Absent Absent    WBCs/high power field 1+ (A) None       ASSESSMENT/PLAN:   1. Adult general medical  "exam  This is a 40 yo female here for physical exam - reviewed HM; check labs - due for pap smear (pap/HPV done/sent).    - REVIEW OF HEALTH MAINTENANCE PROTOCOL ORDERS  - Wet prep - Clinic Collect  - Lipid Profile (Chol, Trig, HDL, LDL calc); Future  - Comprehensive metabolic panel (BMP + Alb, Alk Phos, ALT, AST, Total. Bili, TP); Future  - Pap Screen with HPV - recommended age 30 - 65 years; Future  - Lipid Profile (Chol, Trig, HDL, LDL calc)  - Comprehensive metabolic panel (BMP + Alb, Alk Phos, ALT, AST, Total. Bili, TP)  - Pap Screen with HPV - recommended age 30 - 65 years    2. Vitamin D deficiency  H/o low Vitamin D - check labs   - Vitamin D Deficiency; Future  - Vitamin D Deficiency    3. Anemia, unspecified type  Has h/o anemia - ?iron deficiency - check labs  - CBC with platelets; Future  - Iron and iron binding capacity; Future  - CBC with platelets  - Iron and iron binding capacity    4. Thyroid nodule  H/o thyroid nodule - patient unable to give any specific history - has sl enlarged thyroid on exam   - TSH; Future  - TSH    5. Vitamin B12 deficiency (non anemic)  H/o Vitamin B12 deficiency - check levels   - Vitamin B12; Future  - Vitamin B12    6. Encounter for HCV screening test for low risk patient  Discussed recommendation for Hepatitis C screening - low risk - ordered    - Hepatitis C antibody; Future  - Hepatitis C antibody      Patient has been advised of split billing requirements and indicates understanding: Yes  COUNSELING:  Reviewed preventive health counseling, as reflected in patient instructions       Regular exercise       Healthy diet/nutrition    Estimated body mass index is 26.5 kg/m  as calculated from the following:    Height as of this encounter: 1.483 m (4' 10.39\").    Weight as of this encounter: 58.3 kg (128 lb 8 oz).    Weight management plan: Discussed healthy diet and exercise guidelines    She reports that she has never smoked. She has never used smokeless " tobacco.      Counseling Resources:  ATP IV Guidelines  Pooled Cohorts Equation Calculator  Breast Cancer Risk Calculator  BRCA-Related Cancer Risk Assessment: FHS-7 Tool  FRAX Risk Assessment  ICSI Preventive Guidelines  Dietary Guidelines for Americans, 2010  USDA's MyPlate  ASA Prophylaxis  Lung CA Screening    JOSE ALBERTO KHAN MD  [facility]

## 2021-12-26 LAB — DEPRECATED CALCIDIOL+CALCIFEROL SERPL-MC: 14 UG/L (ref 30–80)

## 2021-12-29 LAB
HUMAN PAPILLOMA VIRUS 16 DNA: NEGATIVE
HUMAN PAPILLOMA VIRUS 18 DNA: NEGATIVE
HUMAN PAPILLOMA VIRUS FINAL DIAGNOSIS: NORMAL
HUMAN PAPILLOMA VIRUS OTHER HR: NEGATIVE

## 2021-12-31 LAB
BKR LAB AP GYN ADEQUACY: NORMAL
BKR LAB AP GYN INTERPRETATION: NORMAL
BKR LAB AP HPV REFLEX: NORMAL
BKR LAB AP LMP: NORMAL
BKR LAB AP PREVIOUS ABNORMAL: NORMAL
PATH REPORT.COMMENTS IMP SPEC: NORMAL
PATH REPORT.COMMENTS IMP SPEC: NORMAL
PATH REPORT.RELEVANT HX SPEC: NORMAL

## 2022-04-29 ENCOUNTER — OFFICE VISIT (OUTPATIENT)
Dept: FAMILY MEDICINE | Facility: CLINIC | Age: 42
End: 2022-04-29
Payer: COMMERCIAL

## 2022-04-29 VITALS
WEIGHT: 126 LBS | SYSTOLIC BLOOD PRESSURE: 111 MMHG | RESPIRATION RATE: 16 BRPM | BODY MASS INDEX: 25.99 KG/M2 | OXYGEN SATURATION: 99 % | DIASTOLIC BLOOD PRESSURE: 72 MMHG | TEMPERATURE: 98.3 F | HEART RATE: 69 BPM

## 2022-04-29 DIAGNOSIS — F33.1 MODERATE EPISODE OF RECURRENT MAJOR DEPRESSIVE DISORDER (H): Primary | ICD-10-CM

## 2022-04-29 DIAGNOSIS — F41.9 ANXIETY: ICD-10-CM

## 2022-04-29 PROCEDURE — 99214 OFFICE O/P EST MOD 30 MIN: CPT | Performed by: NURSE PRACTITIONER

## 2022-04-29 RX ORDER — CITALOPRAM HYDROBROMIDE 10 MG/1
10 TABLET ORAL DAILY
Qty: 31 TABLET | Refills: 0 | Status: SHIPPED | OUTPATIENT
Start: 2022-04-29 | End: 2022-05-27

## 2022-04-29 RX ORDER — HYDROXYZINE HYDROCHLORIDE 10 MG/1
10-20 TABLET, FILM COATED ORAL 3 TIMES DAILY PRN
Qty: 45 TABLET | Refills: 3 | Status: SHIPPED | OUTPATIENT
Start: 2022-04-29 | End: 2022-05-27

## 2022-04-29 RX ORDER — CITALOPRAM HYDROBROMIDE 10 MG/1
10 TABLET ORAL DAILY
Qty: 31 TABLET | Refills: 0 | Status: SHIPPED | OUTPATIENT
Start: 2022-04-29 | End: 2022-04-29

## 2022-04-29 RX ORDER — HYDROXYZINE HYDROCHLORIDE 10 MG/1
10-20 TABLET, FILM COATED ORAL 3 TIMES DAILY PRN
Qty: 45 TABLET | Refills: 3 | Status: SHIPPED | OUTPATIENT
Start: 2022-04-29 | End: 2022-04-29

## 2022-04-29 ASSESSMENT — ANXIETY QUESTIONNAIRES
1. FEELING NERVOUS, ANXIOUS, OR ON EDGE: NEARLY EVERY DAY
6. BECOMING EASILY ANNOYED OR IRRITABLE: MORE THAN HALF THE DAYS
GAD7 TOTAL SCORE: 18
5. BEING SO RESTLESS THAT IT IS HARD TO SIT STILL: SEVERAL DAYS
4. TROUBLE RELAXING: NEARLY EVERY DAY
3. WORRYING TOO MUCH ABOUT DIFFERENT THINGS: NEARLY EVERY DAY
7. FEELING AFRAID AS IF SOMETHING AWFUL MIGHT HAPPEN: NEARLY EVERY DAY
2. NOT BEING ABLE TO STOP OR CONTROL WORRYING: NEARLY EVERY DAY
IF YOU CHECKED OFF ANY PROBLEMS ON THIS QUESTIONNAIRE, HOW DIFFICULT HAVE THESE PROBLEMS MADE IT FOR YOU TO DO YOUR WORK, TAKE CARE OF THINGS AT HOME, OR GET ALONG WITH OTHER PEOPLE: VERY DIFFICULT

## 2022-04-29 ASSESSMENT — PATIENT HEALTH QUESTIONNAIRE - PHQ9: SUM OF ALL RESPONSES TO PHQ QUESTIONS 1-9: 13

## 2022-04-29 NOTE — PROGRESS NOTES
"Assessment & Plan     ICD-10-CM    1. Moderate episode of recurrent major depressive disorder (H)  F33.1 Chronic - unstable. Patient noting ongoing, almost daily sx of anxiety and depression as    2. Anxiety  F41.9 Noted above. She was given something in the ED ~4 years ago but it didn't work - I am unable to see anything other than Benadryl that would be associated with anxiety tx in her chart. She does note that she tried Rexulti in the past from a family member and it help with her anxiety. Education as above. She does have an appointment with her PCP on 5/27 but wait until that time to address concerns based on severity. Will start Celexa 10mg PO at bedtime today along with low-dose Hydroxyzine 10-20mg PO q6H PRN. Educated not to drive after taking Hydroxyzine until she is able to see how her body reacts to it for her safety. Recommended to follow-up with PCP re:psychotherapy to help cope with ongoing concerns.      I spent a total of 30 minutes on the day of the visit.   Time spent doing chart review, history and exam, documentation and further activities per the note      Return in 4 weeks (on 5/27/2022) for Follow up with PCP.    GABBI White CNP  [facility]    Ruy Lei is a 41 year old female who presents to clinic today for the following health issues:  Chief Complaint   Patient presents with     fear and anxiety x  3 weeks      HPI:  Quique Sanford is a 41 year old female who comes to  today for ongoing c/o on severe anxiety. She notes that she has been feeling \"scared of everything\" - if people tell her something scary or she sees something scary she cannot make herself not feel scared of it. This is effecting her on a daily basis. She does note that this has been happening on and off throughout her adulthood - denies childhood onset; but she has never taken anything consistently for it. She notes that she was seen in ED ~4y ago and they gave her something for " "\"feeling scared\" but it did not work. A family member of hers - long distant relation - does suffer from similar concerns but she is unsure what she takes. She did take a Rexulti from another family member and notes that this did make her feel better - I reassure her that she does not need medications as strong as Rexulti at this time as this is for other mental health concerns such as Bipolar and Schizophrenia, which she does not suffer with. She did schedule an appointment with her PCP in May, but could not wait until then as this is effecting her so much. PHQ9 and IZZY noted below.     PHQ 4/29/2022   PHQ-9 Total Score 13   Q9: Thoughts of better off dead/self-harm past 2 weeks Not at all     GAD7 score: 18    Review of Systems  Constitutional, HEENT, cardiovascular, pulmonary, GI, , musculoskeletal, neuro, skin, endocrine and psych systems are negative, except as otherwise noted.      Objective    /72   Pulse 69   Temp 98.3  F (36.8  C)   Resp 16   Wt 57.2 kg (126 lb)   LMP 04/24/2022 (Approximate)   SpO2 99%   Breastfeeding No   BMI 25.99 kg/m    Physical Exam   GENERAL APPEARANCE: healthy, alert, mild distress and cooperative  EYES: Eyes grossly normal to inspection, PERRL, conjunctivae and sclerae normal and lids and lashes normal  RESP: No respiratory distress; cough - none  PSYCH: mentation appears abnormal concerned, anxious and worried  MENTAL STATUS EXAM:  Appearance/Behavior: Distressed, Neatly groomed, Dressed appropriately for weather and Appears stated age  Speech: Normal  Mood/Affect: depressed affect and anxiety  Insight: Adequate  "

## 2022-05-27 ENCOUNTER — OFFICE VISIT (OUTPATIENT)
Dept: FAMILY MEDICINE | Facility: CLINIC | Age: 42
End: 2022-05-27
Payer: COMMERCIAL

## 2022-05-27 VITALS
OXYGEN SATURATION: 100 % | WEIGHT: 126 LBS | BODY MASS INDEX: 25.99 KG/M2 | HEART RATE: 62 BPM | DIASTOLIC BLOOD PRESSURE: 60 MMHG | SYSTOLIC BLOOD PRESSURE: 102 MMHG

## 2022-05-27 DIAGNOSIS — F41.9 ANXIETY: ICD-10-CM

## 2022-05-27 DIAGNOSIS — F33.1 MODERATE EPISODE OF RECURRENT MAJOR DEPRESSIVE DISORDER (H): ICD-10-CM

## 2022-05-27 DIAGNOSIS — E04.1 THYROID NODULE: Primary | ICD-10-CM

## 2022-05-27 DIAGNOSIS — D50.9 IRON DEFICIENCY ANEMIA, UNSPECIFIED IRON DEFICIENCY ANEMIA TYPE: ICD-10-CM

## 2022-05-27 PROCEDURE — 91305 COVID-19,PF,PFIZER (12+ YRS): CPT | Performed by: FAMILY MEDICINE

## 2022-05-27 PROCEDURE — 0054A COVID-19,PF,PFIZER (12+ YRS): CPT | Performed by: FAMILY MEDICINE

## 2022-05-27 PROCEDURE — 99214 OFFICE O/P EST MOD 30 MIN: CPT | Mod: 25 | Performed by: FAMILY MEDICINE

## 2022-05-27 RX ORDER — PRENATAL VIT/IRON FUM/FOLIC AC 27MG-0.8MG
1 TABLET ORAL DAILY
Qty: 90 TABLET | Refills: 3 | Status: SHIPPED | OUTPATIENT
Start: 2022-05-27

## 2022-05-27 RX ORDER — HYDROXYZINE HYDROCHLORIDE 10 MG/1
10-20 TABLET, FILM COATED ORAL 3 TIMES DAILY PRN
Qty: 45 TABLET | Refills: 3 | Status: SHIPPED | OUTPATIENT
Start: 2022-05-27

## 2022-05-27 NOTE — PATIENT INSTRUCTIONS
I am glad to hear you are feeling a little bit better Youa    You are always welcome to see a therapist    Continue hydroxyzine 10 mg  This is a medication that helps to make you a little bit more relaxed  It is a medicine like Benadryl  It can be used as needed    I would like you to do a multivitamin with iron  Prenatal vitamin with iron 1 daily  Your iron levels were low  Also eat foods rich in iron as well as vitamin C    My biggest concern is your thyroid  You had a thyroid nodule that met criteria for a biopsy  I would like you to repeat your thyroid ultrasound and follow-up with Dr. Derrick Robison in [facility] at [facility]    You will receive your fourth COVID-vaccine today

## 2022-05-27 NOTE — ASSESSMENT & PLAN NOTE
" [facility]   Anxiety     Received Citalopram and Hydroxyzine     Hydroxyzine gone   Citalopram 10 mg take it \"makes me tired\"    Symptom:    Scared of things\"    During that time \"scared\"  Now things are better\"    I need the one that I take during the day >> \"Hydroxyzine\"  May me \"I take it and I don't feel afraid\"    Sleep OK   Kids good    Work     IN the past   "

## 2022-05-27 NOTE — PROGRESS NOTES
"    ASSESSMENT & PLAN    Anxiety   Genesis   Anxiety     Received Citalopram and Hydroxyzine     Hydroxyzine gone   Citalopram 10 mg take it \"makes me tired\"    Symptom:    Scared of things\"    During that time \"scared\"  Now things are better\"    I need the one that I take during the day >> \"Hydroxyzine\"  May me \"I take it and I don't feel afraid\"    Sleep OK   Kids good    Work     IN the past       Quique was seen today for anxiety and follow up.    Diagnoses and all orders for this visit:    Thyroid nodule  -     US Thyroid; Future  -     Adult ENT  Referral; Future    Anxiety  -     hydrOXYzine (ATARAX) 10 MG tablet; Take 1-2 tablets (10-20 mg) by mouth 3 times daily as needed for anxiety    Moderate episode of recurrent major depressive disorder (H)  -     hydrOXYzine (ATARAX) 10 MG tablet; Take 1-2 tablets (10-20 mg) by mouth 3 times daily as needed for anxiety    Iron deficiency anemia, unspecified iron deficiency anemia type  -     Prenatal Vit-Fe Fumarate-FA (PRENATAL MULTIVITAMIN W/IRON) 27-0.8 MG tablet; Take 1 tablet by mouth daily    Other orders  -     COVID-19,PF,PFIZER (12+ Yrs GRAY LABEL)        There are no Patient Instructions on file for this visit.    Return in about 1 month (around 6/27/2022).       PATIENT HEALTH QUESTIONNAIRE-9 (PHQ - 9)    Over the last 2 weeks, how often have you been bothered by any of the following problems?    1. Little interest or pleasure in doing things -      2. Feeling down, depressed, or hopeless -      3. Trouble falling or staying asleep, or sleeping too much -     4. Feeling tired or having little energy -      5. Poor appetite or overeating -      6. Feeling bad about yourself - or that you are a failure or have let yourself or your family down -      7. Trouble concentrating on things, such as reading the newspaper or watching television -     8. Moving or speaking so slowly that other people could have noticed? Or the opposite - being so fidgety " or restless that you have been moving around a lot more than usual     9. Thoughts that you would be better off dead or of hurting  yourself in some way     Total Score:       If you checked off any problems, how difficult have these problems made it for you to do your work, take care of things at home, or get along with other people?      Developed by Marine Daigle, Adriana Mccain, David Rowe and colleagues, with an educational nico from [employer] No permission required to reproduce, translate, display or distribute. permission required to reproduce, translate, display or distribute.    CHIEF COMPLAINT: Quique Sanford had concerns including Anxiety and Follow Up.    Cahto: 1.............. SUBJECTIVE:  Quique Sanford is a 42 year old female had concerns including Anxiety and Follow Up.    1. Thyroid nodule    2. Anxiety    3. Moderate episode of recurrent major depressive disorder (H)    4. Iron deficiency anemia, unspecified iron deficiency anemia type          No Known Allergies                      SOCIAL: She  reports that she has never smoked. She has never used smokeless tobacco. She reports that she does not drink alcohol and does not use drugs.    REVIEW OF SYSTEMS:   Family history not pertinent to chief complaint or presenting problem    Review of systems otherwise negative as requested from patient, except   Those positive ROS outlined and discussed in Cahto.      VITALS:  Vitals:    05/27/22 1024   BP: 102/60   Pulse: 62   SpO2: 100%   Weight: 57.2 kg (126 lb)     Wt Readings from Last 3 Encounters:   05/27/22 57.2 kg (126 lb)   04/29/22 57.2 kg (126 lb)   12/24/21 58.3 kg (128 lb 8 oz)     Body mass index is 25.99 kg/m .    Physical Exam:  Left thyroid nodule 1.5     No cervical or supraclavicular nodes  Full range of affect  Nonpressured speech       I spent20  minutes with this patient.  This includes pre-visit, intra-visit and post visit work an evaluation with regards to Quique was seen today  for anxiety and follow up.    Diagnoses and all orders for this visit:    Thyroid nodule  -     US Thyroid; Future  -     Adult ENT  Referral; Future    Anxiety  -     hydrOXYzine (ATARAX) 10 MG tablet; Take 1-2 tablets (10-20 mg) by mouth 3 times daily as needed for anxiety    Moderate episode of recurrent major depressive disorder (H)  -     hydrOXYzine (ATARAX) 10 MG tablet; Take 1-2 tablets (10-20 mg) by mouth 3 times daily as needed for anxiety    Iron deficiency anemia, unspecified iron deficiency anemia type  -     Prenatal Vit-Fe Fumarate-FA (PRENATAL MULTIVITAMIN W/IRON) 27-0.8 MG tablet; Take 1 tablet by mouth daily    Other orders  -     COVID-19,PF,PFIZER (12+ Yrs GRAY LABEL)        Robert Chandra MD  [facility] [address] [phone]

## 2022-06-10 ENCOUNTER — HOSPITAL ENCOUNTER (OUTPATIENT)
Dept: ULTRASOUND IMAGING | Facility: HOSPITAL | Age: 42
Discharge: HOME | End: 2022-06-10
Attending: FAMILY MEDICINE | Admitting: FAMILY MEDICINE
Payer: COMMERCIAL

## 2022-06-10 DIAGNOSIS — E04.1 THYROID NODULE: ICD-10-CM

## 2022-06-10 DIAGNOSIS — R93.89 ABNORMAL THYROID ULTRASOUND: Primary | ICD-10-CM

## 2022-06-10 PROCEDURE — 76536 US EXAM OF HEAD AND NECK: CPT

## 2022-06-16 PROBLEM — E04.1 THYROID NODULE: Status: ACTIVE | Noted: 2022-06-16

## 2022-07-08 ENCOUNTER — OFFICE VISIT (OUTPATIENT)
Dept: OTOLARYNGOLOGY | Facility: CLINIC | Age: 42
End: 2022-07-08
Attending: FAMILY MEDICINE
Payer: COMMERCIAL

## 2022-07-08 DIAGNOSIS — E04.1 THYROID NODULE: ICD-10-CM

## 2022-07-08 PROCEDURE — 99243 OFF/OP CNSLTJ NEW/EST LOW 30: CPT | Performed by: OTOLARYNGOLOGY

## 2022-07-08 NOTE — LETTER
7/8/2022         RE: Quique Sanford  [address] [place] [address]        Dear Colleague,    Thank you for referring your patient, Quique Sanford, to the [facility]. Please see a copy of my visit note below.    HPI: This patient is a 43yo F who presents to clinic for evaluation of a thyroid at the request of Dr. Chandra. It was picked up on a physical exam over 1.5yrs ago. It was recommended then that she have a biopsy, but she states that she wasn't ready. She had another ultrasound showing that the concerning nodule has grown slightly over the time since her last visit. She states that she still is not ready to have a biopsy of this. Denies fevers, unintentional weight loss, odynophagia, dysphagia, hemoptysis, voice changes, and shortness of breath.     Past medical history, surgical history, social history, family history, medications, and allergies have been reviewed with the patient and are documented above.    Review of Systems: a 10-system review was performed. Pertinent positives are noted in the HPI and on a separate scanned document in the chart.    PHYSICAL EXAMINATION:  GEN: no acute distress, normocephalic  EYES: extraocular movements are intact, pupils are equal and round. Sclera clear.   EARS: auricles are normally formed. The external auditory canals are clear with minimal to no cerumen. Tympanic membranes are intact bilaterally with no signs of infection, effusion, retractions, or perforations.  NOSE: anterior nares are patent. There are no masses or lesions. The septum is non-obstructing.  OC/OP: clear, dentition is in good repair. The tongue and palate are fully mobile and symmetric. The floor of mouth, base of tongue, and tonsils are soft and symmetric.  NECK: soft and supple. No lymphadenopathy or masses. Airway is midline.  NEURO: CN VII and XII symmetric. alert and oriented. No spontaneous nystagmus. Gait is normal.  PULM: breathing comfortably on room air, normal  chest expansion with respiration  CARDS: no cyanosis or clubbing, normal carotid pulses    THYROID U/S 6/2022:  IMPRESSION:  1.  Slightly larger left thyroid 1.7 cm TI-RADS 5 nodule. FNA recommended.  2.  Stable right isthmic 1.2 cm TI-RADS 4 nodule. Continued annual follow-up recommended.    THYROID U/S 1/2021:  IMPRESSION:  1.  Two dominant nodules one in the superior aspect of the left lobe is category TI-RADS 5 and ultrasound-guided FNA biopsy is recommended.  2.  Second nodule in the right aspect of the isthmus is TI-RADS 4 lesion but less than 1.5 cm and follow-up ultrasound in one year is recommended.    FNA: patient refused in 2021 and is refusing it now    MEDICAL DECISION-MAKING: This patient is a 43yo F with concerning thyroid nodules. Tried to explain why the FNA is important, in that she could have a currently undiagnosed thyroid cancer, but we do not know this without a biopsy. She will discuss a biopsy with her family and decide.         Again, thank you for allowing me to participate in the care of your patient.        Sincerely,        Jennifer Jenkins MD

## 2022-07-08 NOTE — PROGRESS NOTES
HPI: This patient is a 41yo F who presents to clinic for evaluation of a thyroid at the request of Dr. Chandra. It was picked up on a physical exam over 1.5yrs ago. It was recommended then that she have a biopsy, but she states that she wasn't ready. She had another ultrasound showing that the concerning nodule has grown slightly over the time since her last visit. She states that she still is not ready to have a biopsy of this. Denies fevers, unintentional weight loss, odynophagia, dysphagia, hemoptysis, voice changes, and shortness of breath.     Past medical history, surgical history, social history, family history, medications, and allergies have been reviewed with the patient and are documented above.    Review of Systems: a 10-system review was performed. Pertinent positives are noted in the HPI and on a separate scanned document in the chart.    PHYSICAL EXAMINATION:  GEN: no acute distress, normocephalic  EYES: extraocular movements are intact, pupils are equal and round. Sclera clear.   EARS: auricles are normally formed. The external auditory canals are clear with minimal to no cerumen. Tympanic membranes are intact bilaterally with no signs of infection, effusion, retractions, or perforations.  NOSE: anterior nares are patent. There are no masses or lesions. The septum is non-obstructing.  OC/OP: clear, dentition is in good repair. The tongue and palate are fully mobile and symmetric. The floor of mouth, base of tongue, and tonsils are soft and symmetric.  NECK: soft and supple. No lymphadenopathy or masses. Airway is midline.  NEURO: CN VII and XII symmetric. alert and oriented. No spontaneous nystagmus. Gait is normal.  PULM: breathing comfortably on room air, normal chest expansion with respiration  CARDS: no cyanosis or clubbing, normal carotid pulses    THYROID U/S 6/2022:  IMPRESSION:  1.  Slightly larger left thyroid 1.7 cm TI-RADS 5 nodule. FNA recommended.  2.  Stable right isthmic 1.2 cm TI-RADS  4 nodule. Continued annual follow-up recommended.    THYROID U/S 1/2021:  IMPRESSION:  1.  Two dominant nodules one in the superior aspect of the left lobe is category TI-RADS 5 and ultrasound-guided FNA biopsy is recommended.  2.  Second nodule in the right aspect of the isthmus is TI-RADS 4 lesion but less than 1.5 cm and follow-up ultrasound in one year is recommended.    FNA: patient refused in 2021 and is refusing it now    MEDICAL DECISION-MAKING: This patient is a 41yo F with concerning thyroid nodules. Tried to explain why the FNA is important, in that she could have a currently undiagnosed thyroid cancer, but we do not know this without a biopsy. She will discuss a biopsy with her family and decide.

## 2022-09-23 ENCOUNTER — LAB REQUISITION (OUTPATIENT)
Dept: LAB | Facility: CLINIC | Age: 42
End: 2022-09-23

## 2022-09-23 DIAGNOSIS — Z13.29 ENCOUNTER FOR SCREENING FOR OTHER SUSPECTED ENDOCRINE DISORDER: ICD-10-CM

## 2022-09-23 DIAGNOSIS — D64.9 ANEMIA, UNSPECIFIED: ICD-10-CM

## 2022-09-23 LAB
FERRITIN SERPL-MCNC: 22 NG/ML (ref 6–175)
IRON SATN MFR SERPL: 12 % (ref 15–46)
IRON SERPL-MCNC: 48 UG/DL (ref 35–180)
TIBC SERPL-MCNC: 403 UG/DL (ref 240–430)
TSH SERPL DL<=0.005 MIU/L-ACNC: 1.19 UIU/ML (ref 0.3–4.2)

## 2022-09-23 PROCEDURE — 84443 ASSAY THYROID STIM HORMONE: CPT | Performed by: FAMILY MEDICINE

## 2022-09-23 PROCEDURE — 82728 ASSAY OF FERRITIN: CPT | Performed by: FAMILY MEDICINE

## 2022-09-23 PROCEDURE — 83550 IRON BINDING TEST: CPT | Performed by: FAMILY MEDICINE

## 2023-03-24 ENCOUNTER — TELEPHONE (OUTPATIENT)
Dept: OPHTHALMOLOGY | Facility: CLINIC | Age: 43
End: 2023-03-24

## 2023-03-24 ENCOUNTER — OFFICE VISIT (OUTPATIENT)
Dept: FAMILY MEDICINE | Facility: CLINIC | Age: 43
End: 2023-03-24
Payer: COMMERCIAL

## 2023-03-24 VITALS
BODY MASS INDEX: 27.84 KG/M2 | WEIGHT: 135 LBS | HEART RATE: 75 BPM | RESPIRATION RATE: 16 BRPM | DIASTOLIC BLOOD PRESSURE: 74 MMHG | SYSTOLIC BLOOD PRESSURE: 109 MMHG | OXYGEN SATURATION: 99 % | TEMPERATURE: 97.8 F

## 2023-03-24 DIAGNOSIS — B02.8 HERPES ZOSTER WITH COMPLICATION: Primary | ICD-10-CM

## 2023-03-24 PROCEDURE — 99215 OFFICE O/P EST HI 40 MIN: CPT | Performed by: FAMILY MEDICINE

## 2023-03-24 RX ORDER — VALACYCLOVIR HYDROCHLORIDE 1 G/1
1000 TABLET, FILM COATED ORAL 3 TIMES DAILY
Qty: 21 TABLET | Refills: 0 | Status: SHIPPED | OUTPATIENT
Start: 2023-03-24 | End: 2023-03-31

## 2023-03-24 NOTE — PATIENT INSTRUCTIONS
Start valacyclovir 3 times a day for 7 days to treat your shingles          If eye pain, changes in vision, light hurts your eye, flashes of light, floaters-small spots floating across your vision to [facility] ER on the [place] bank-this is the only ER that has on call eye doctors to take care of your eye     Appointment with the eye doctor-[facility] eye clinic    Monday, 3/27/2023 at 8:30 -Dr. Sainz     [facility]   [address] [place] [address]     Eye clinic is on the 9th floor

## 2023-03-24 NOTE — PROGRESS NOTES
ASSESSMENT/PLAN:      ICD-10-CM    1. Herpes zoster with complication  B02.8 valACYclovir (VALTREX) 1000 mg tablet    lesion on lower eyelid and 2 on upper eyelid, eye precautions reviewed         Discussed facial rash with small lesions on upper eyelid and lower eyelid with on-call ophthalmology provider at the [facility] Dr. Lillie Prakash.  At present patient is not having any visual changes, eye pain at rest or with extraocular motion, photophobia, floaters or flashes of light.  Dr. Prakash recommended the onset of any of the symptoms, patient  instructed to proceed to the [facility] ER where she can receive a ophthalmology consultation.  Patient and family understand there is no other ER in the area that has access to an on-call ophthalmology/optometrist.  Dr. Prakash arrange for an appointment for patient to be evaluated on Monday, 3/27/2023 at 8:30 AM at the [facility] ophthalmology clinic with Dr. Alexander.  Patient and family agree with plan.     Will start valacyclovir for her herpes zoster rash/shingles.  Patient has not been immunized for shingles and will discuss with her PCP about getting a shingles vaccine in the future.        Reviewed medication instructions and side effects. Follow up if experiences side effects.     I reviewed supportive care, otc meds to use if needed, expected course, and signs of concern.  Follow up as needed or if she does not improve within  1-2 days or if worsens in any way.  Reviewed red flag symptoms and is to go to the ER if experiences any of these.     The use of Dragon/MindQuilt dictation services may have been used to construct the content in this note; any grammatical or spelling errors are non-intentional. Please contact the author of this note directly if you are in need of any clarification.      Due to language barrier, an  was present during the history-taking and subsequent discussion and physical exam  with this  patient.  Daughter of patient also assisted with interpreting.      On the day of the encounter, time spend on chart review, patient visit, review of testing, documentation was 40  minutes          Patient Instructions   Start valacyclovir 3 times a day for 7 days to treat your shingles          If eye pain, changes in vision, light hurts your eye, flashes of light, floaters-small spots floating across your vision to [facility] ER on the [place] bank-this is the only ER that has on call eye doctors to take care of your eye     Appointment with the eye doctor-[facility]    Monday, 3/27/2023 at 8:30 -Dr. Sainz     [facility]   [address] [place] [address]     Eye clinic is on the 9th floor              Patient presents with:  Rash: Rash on Lt face to Lt side top of head x Sunday. Itchy and tightness. Red. No discharge.       Subjective     Quique Sanford is a 42 year old female who presents to clinic today for the following health issues:    HPI   Onset of itching rash on left side of upper forehead, now purple red in color and painful for the last 2 days and extends into the scalp and down to the left eye.  Now has 3 small lesions on upper eyelid 1 small lesion on the lower lid denies photophobia, changes in vision , eye pain, floaters, flashes of light,  No fever, no history of similar rash in the past, she is not vaccinated for shingles    Past Medical History:   Diagnosis Date     Anemia      Cholelithiasis      Migraine      Vitamin D deficiency      Social History     Tobacco Use     Smoking status: Never     Smokeless tobacco: Never   Substance Use Topics     Alcohol use: No       Current Outpatient Medications   Medication Sig Dispense Refill     valACYclovir (VALTREX) 1000 mg tablet Take 1 tablet (1,000 mg) by mouth 3 times daily for 7 days 21 tablet 0     hydrOXYzine (ATARAX) 10 MG tablet Take 1-2 tablets (10-20 mg) by mouth 3 times daily as needed for  anxiety (Patient not taking: Reported on 3/24/2023) 45 tablet 3     Prenatal Vit-Fe Fumarate-FA (PRENATAL MULTIVITAMIN W/IRON) 27-0.8 MG tablet Take 1 tablet by mouth daily (Patient not taking: Reported on 3/24/2023) 90 tablet 3     No Known Allergies          ROS are negative, except as otherwise noted HPI      Objective    /74   Pulse 75   Temp 97.8  F (36.6  C) (Oral)   Resp 16   Wt 61.2 kg (135 lb)   SpO2 99%   BMI 27.84 kg/m    Body mass index is 27.84 kg/m .  Physical Exam   GENERAL: alert and mild distress  EYES: Eyes bilateral grossly normal to inspection, PERRL, extraocular motion intact without pain and conjunctivae and sclerae normal    SKIN: Dark pink-red patches tender painful mild excoriated in the dermatomal pattern starting at the left side of forehead extending into the scalp and in addition extends down to left upper eyelid where there appeared to be 2-3 lesions and one lesion on the middle of the lower inner eyelid  NEURO: Normal strength and tone, mentation intact and speech normal      Diagnostic Test Results:  Labs reviewed in Epic  No results found for any visits on 03/24/23.

## 2023-03-24 NOTE — TELEPHONE ENCOUNTER
Telephone Note    I was contacted by Dr. Sarabia from outside office regarding this patient on 3/24/23. The following information was obtained via phone call with this provider.      Patient is a 42-year-old female who presents with facial shingles. Provider states that patient developed painful rash extending from scalp to around left face. She denies any changes in vision, eye pain, flashes, floaters, loss of vision.    Patient's past ocular history:   None      Outside provider's exam revealed:   - No changes in vision  - Herpetic lesions extending from left scalp to left face. One small lesion on left lower lid and 3 small lesions of left upper lid.      I conveyed to the consulting physician that I could provide some general thoughts regarding this patient but that a formal consult and evaluation would be necessary for me to provide an active role in the patient's care. Given the reported examination findings, I emphasized the importance of ophthalmology evaluation and I offered to see the patient in the ED today. As alternatives, I also offered the patient follow up with the eye clinic early next week, based on provider comfort. I conveyed to the provider that if there was any concern about the patient's care or my suggestions, the patient should be sent to the ED for evaluation right away.     Discussed importance of return precautions and that patient should go to ED immediately if she develops any vision changes/symptoms.     Following communication with the patient, the provider and the patient selected to be seen in the [facility] clinic.       Message sent to staff to schedule for follow up on Monday 3/27/23 at 8:30am.    Nuzhat Prakash MD  Ophthalmology Resident, PGY-2

## 2023-03-27 ENCOUNTER — OFFICE VISIT (OUTPATIENT)
Dept: OPHTHALMOLOGY | Facility: CLINIC | Age: 43
End: 2023-03-27
Attending: STUDENT IN AN ORGANIZED HEALTH CARE EDUCATION/TRAINING PROGRAM
Payer: COMMERCIAL

## 2023-03-27 DIAGNOSIS — B02.30 HERPES ZOSTER OPHTHALMICUS OF LEFT EYE: ICD-10-CM

## 2023-03-27 DIAGNOSIS — B02.30 HERPES ZOSTER OPHTHALMICUS OF LEFT EYE: Primary | ICD-10-CM

## 2023-03-27 PROCEDURE — 92004 COMPRE OPH EXAM NEW PT 1/>: CPT | Mod: GC | Performed by: STUDENT IN AN ORGANIZED HEALTH CARE EDUCATION/TRAINING PROGRAM

## 2023-03-27 RX ORDER — ERYTHROMYCIN 5 MG/G
OINTMENT OPHTHALMIC
Qty: 3.5 G | Refills: 1 | Status: SHIPPED | OUTPATIENT
Start: 2023-03-27 | End: 2023-03-28

## 2023-03-27 ASSESSMENT — CONF VISUAL FIELD
OS_INFERIOR_NASAL_RESTRICTION: 0
OD_INFERIOR_TEMPORAL_RESTRICTION: 0
OS_SUPERIOR_NASAL_RESTRICTION: 0
OS_NORMAL: 1
OD_SUPERIOR_NASAL_RESTRICTION: 0
OS_INFERIOR_TEMPORAL_RESTRICTION: 0
OD_INFERIOR_NASAL_RESTRICTION: 0
OD_SUPERIOR_TEMPORAL_RESTRICTION: 0
OS_SUPERIOR_TEMPORAL_RESTRICTION: 0
OD_NORMAL: 1

## 2023-03-27 ASSESSMENT — VISUAL ACUITY
OS_PH_CC: 20/20
METHOD: SNELLEN - LINEAR
OS_CC: 20/25
OD_CC: 20/20

## 2023-03-27 ASSESSMENT — CUP TO DISC RATIO
OS_RATIO: 0.4
OD_RATIO: 0.4

## 2023-03-27 NOTE — LETTER
March 27, 2023      Quique Sanford  [address] [place] [address]        To Whom It May Concern:    Quique Sanford  was seen on 3/27/23.  Please excuse her from work today (3/27/23). She was seen in the eye clinic for an acute medical illness.        Sincerely,            Gabriel Forbes MD

## 2023-03-27 NOTE — TELEPHONE ENCOUNTER
[facility] Call Center    Phone Message    May a detailed message be left on voicemail: yes     Reason for Call: Medication Question or concern regarding medication   Prescription Clarification  Name of Medication: erythromycin (ROMYCIN) 5 MG/GM ophthalmic ointment  Prescribing Provider: Gabriel Forbes MD   Pharmacy: [id number] - [place], [place] - [address] AT [facility]   What on the order needs clarification? Provider is not authorized to sign for this Rx.      Action Taken: Message routed to:  [facility] Ophthalmology    Travel Screening: Not Applicable

## 2023-03-27 NOTE — PROGRESS NOTES
Ophthalmology Acute Clinic     HPI:   Quique Sanford is a 42 year old female who presents for family medicine outside clinic follow-up, herpes zoster of left face/eyelid.    Patient was seen at her family medicine clinic on 3/24/2023 for shingles rash of left side of face.  No changes in vision.  No photophobia, no pain with eye movement, no new floaters or flashes of light.  She was started on Valtrex 1 g 3 times a day for 7 days.    Today states,   Zoster rash in V1 distribution has been present for the past 8 days, she developed it on [place] 3/19/23. No changes in vision. No photophobia. Mild pain, that improves with tylenol. No floaters, no flashes of light.     Past Ocular history:   - Glasses: yes  - Last eye exam was 4-5 months ago, for dilation and refraction at outside practice  - Contact lens wear: no  - Ocular medical history: no  - Ocular surgical history: no  - Current eye drops: none    PMH: none. No diabetes. Overall healthy. Does not remember having chicken-pox rash as a child    FH: No family history of glaucoma, AMD, or other ocular disease    SH: does not use tobacco    Review of systems for the eyes was negative other than the pertinent positives/negatives listed in the HPI.        Assessment & Plan      Quique Sanford is a 42 year old female with the following diagnoses:   1. Herpes zoster ophthalmicus of left eye      - Left, V1 distribution, zoster rash. Crusted over. Present for past 8 days  - No vision changes, no vision complaints  - No dendritic or pseudodendritic lesions, no cell/flare, no retinitis  PLAN:  -Continue valacyclovir 1g, PO, TID, for full 7 day course  -Apply erythromycin ointment to skin lesions BID until healed  -Warm compresses to periocular skin TID  -RTC in 2 weeks for recheck, sooner if any signs/symptoms of concern present such as decreased vision, eye redness, eye pain, photophobia, or any other change from baseline  -Discussed need for PCP appointment in near future,  especially if developing neuropathic pain  -Work note given for today's visit  - counseled return precautions    Patient seen with Dr. Miller Forbes MD, PGY2  Ophthalmology Resident  [facility]    Attending Physician Attestation:  Complete documentation of historical and exam elements from today's encounter can be found in the full encounter summary report (not reduplicated in this progress note).  I personally obtained the chief complaint(s) and history of present illness.  I confirmed and edited as necessary the review of systems, past medical/surgical history, family history, social history, and examination findings as documented by others; and I examined the patient myself.  I personally reviewed the relevant tests, images, and reports as documented above.  I formulated and edited as necessary the assessment and plan and discussed the findings and management plan with the patient and family. . - Selina Bhatt MD

## 2023-03-27 NOTE — PATIENT INSTRUCTIONS
Take valtrex pill three times a day for 7 days  Apply antibiotic ointment to the lesions on skin and eyelid twice a day until completely healed.  If you have new worsening in vision, eye pain, sensitivity to light, new black spots or flashes of light in vision, let the eye clinic know and we will see you immediately.      Noj valtrex ntsiav tshuaj peb zaug ib hnub compa 7 hnub Siv tshuaj tua kab mob compa cov kab mob ntawm daim tawv nqaij thiab daim tawv muag ob zaug ib hnub kom txog thaum ezequiel tag. Yog tias koj muaj qhov tsis pom henry, qhov muag mob, rhiab heev

## 2023-03-28 DIAGNOSIS — B02.30 HERPES ZOSTER OPHTHALMICUS OF LEFT EYE: ICD-10-CM

## 2023-03-28 RX ORDER — ERYTHROMYCIN 5 MG/G
OINTMENT OPHTHALMIC
Qty: 3.5 G | Refills: 2 | Status: SHIPPED | OUTPATIENT
Start: 2023-03-28

## 2023-03-28 RX ORDER — ERYTHROMYCIN 5 MG/G
OINTMENT OPHTHALMIC
Qty: 3.5 G | Refills: 2 | Status: SHIPPED | OUTPATIENT
Start: 2023-03-28 | End: 2023-03-28

## 2023-03-28 NOTE — TELEPHONE ENCOUNTER
erythromycin (ROMYCIN) 5 MG/GM ophthalmic ointment   Provider is not authorized to sign for this Rx.  Please have another provider sign for this medication for Pt care.     Thank you    Christie Ingram RN  Central Triage Red Flags/Med Refills

## 2023-03-30 ASSESSMENT — EXTERNAL EXAM - RIGHT EYE: OD_EXAM: WNL

## 2023-03-30 ASSESSMENT — SLIT LAMP EXAM - LIDS: COMMENTS: WNL

## 2023-03-30 ASSESSMENT — TONOMETRY
OD_IOP_MMHG: 19
IOP_METHOD: TONOPEN
OS_IOP_MMHG: 19

## 2023-03-30 ASSESSMENT — EXTERNAL EXAM - LEFT EYE: OS_EXAM: WNL

## 2023-07-14 ENCOUNTER — LAB REQUISITION (OUTPATIENT)
Dept: LAB | Facility: CLINIC | Age: 43
End: 2023-07-14

## 2023-07-14 DIAGNOSIS — Z13.220 ENCOUNTER FOR SCREENING FOR LIPOID DISORDERS: ICD-10-CM

## 2023-07-14 DIAGNOSIS — Z13.29 ENCOUNTER FOR SCREENING FOR OTHER SUSPECTED ENDOCRINE DISORDER: ICD-10-CM

## 2023-07-14 DIAGNOSIS — Z13.1 ENCOUNTER FOR SCREENING FOR DIABETES MELLITUS: ICD-10-CM

## 2023-07-14 LAB
ALBUMIN SERPL BCG-MCNC: 4.8 G/DL (ref 3.5–5.2)
ALP SERPL-CCNC: 51 U/L (ref 35–104)
ALT SERPL W P-5'-P-CCNC: 18 U/L (ref 0–50)
ANION GAP SERPL CALCULATED.3IONS-SCNC: 13 MMOL/L (ref 7–15)
AST SERPL W P-5'-P-CCNC: 19 U/L (ref 0–45)
BILIRUB SERPL-MCNC: 0.5 MG/DL
BUN SERPL-MCNC: 9.6 MG/DL (ref 6–20)
CALCIUM SERPL-MCNC: 9.4 MG/DL (ref 8.6–10)
CHLORIDE SERPL-SCNC: 105 MMOL/L (ref 98–107)
CHOLEST SERPL-MCNC: 201 MG/DL
CREAT SERPL-MCNC: 0.67 MG/DL (ref 0.51–0.95)
DEPRECATED HCO3 PLAS-SCNC: 22 MMOL/L (ref 22–29)
GFR SERPL CREATININE-BSD FRML MDRD: >90 ML/MIN/1.73M2
GLUCOSE SERPL-MCNC: 85 MG/DL (ref 70–99)
HDLC SERPL-MCNC: 53 MG/DL
LDLC SERPL CALC-MCNC: 131 MG/DL
NONHDLC SERPL-MCNC: 148 MG/DL
POTASSIUM SERPL-SCNC: 3.9 MMOL/L (ref 3.4–5.3)
PROT SERPL-MCNC: 7.9 G/DL (ref 6.4–8.3)
SODIUM SERPL-SCNC: 140 MMOL/L (ref 136–145)
TRIGL SERPL-MCNC: 83 MG/DL
TSH SERPL DL<=0.005 MIU/L-ACNC: 2.04 UIU/ML (ref 0.3–4.2)

## 2023-07-14 PROCEDURE — 80053 COMPREHEN METABOLIC PANEL: CPT | Performed by: FAMILY MEDICINE

## 2023-07-14 PROCEDURE — 84443 ASSAY THYROID STIM HORMONE: CPT | Performed by: FAMILY MEDICINE

## 2023-07-14 PROCEDURE — 80061 LIPID PANEL: CPT | Performed by: FAMILY MEDICINE

## 2024-08-23 ENCOUNTER — LAB REQUISITION (OUTPATIENT)
Dept: LAB | Facility: CLINIC | Age: 44
End: 2024-08-23

## 2024-08-23 DIAGNOSIS — Z13.21 ENCOUNTER FOR SCREENING FOR NUTRITIONAL DISORDER: ICD-10-CM

## 2024-08-23 DIAGNOSIS — Z13.1 ENCOUNTER FOR SCREENING FOR DIABETES MELLITUS: ICD-10-CM

## 2024-08-23 DIAGNOSIS — Z13.29 ENCOUNTER FOR SCREENING FOR OTHER SUSPECTED ENDOCRINE DISORDER: ICD-10-CM

## 2024-08-23 DIAGNOSIS — Z13.220 ENCOUNTER FOR SCREENING FOR LIPOID DISORDERS: ICD-10-CM

## 2024-08-23 LAB
ALBUMIN SERPL BCG-MCNC: 4.5 G/DL (ref 3.5–5.2)
ALP SERPL-CCNC: 58 U/L (ref 40–150)
ALT SERPL W P-5'-P-CCNC: 28 U/L (ref 0–50)
ANION GAP SERPL CALCULATED.3IONS-SCNC: 14 MMOL/L (ref 7–15)
AST SERPL W P-5'-P-CCNC: 25 U/L (ref 0–45)
BILIRUB SERPL-MCNC: 0.6 MG/DL
BUN SERPL-MCNC: 10.2 MG/DL (ref 6–20)
CALCIUM SERPL-MCNC: 9.6 MG/DL (ref 8.8–10.4)
CHLORIDE SERPL-SCNC: 105 MMOL/L (ref 98–107)
CHOLEST SERPL-MCNC: 181 MG/DL
CREAT SERPL-MCNC: 0.59 MG/DL (ref 0.51–0.95)
EGFRCR SERPLBLD CKD-EPI 2021: >90 ML/MIN/1.73M2
FASTING STATUS PATIENT QL REPORTED: ABNORMAL
FASTING STATUS PATIENT QL REPORTED: ABNORMAL
GLUCOSE SERPL-MCNC: 76 MG/DL (ref 70–99)
HCO3 SERPL-SCNC: 19 MMOL/L (ref 22–29)
HDLC SERPL-MCNC: 51 MG/DL
LDLC SERPL CALC-MCNC: 114 MG/DL
NONHDLC SERPL-MCNC: 130 MG/DL
POTASSIUM SERPL-SCNC: 4.2 MMOL/L (ref 3.4–5.3)
PROT SERPL-MCNC: 8.2 G/DL (ref 6.4–8.3)
SODIUM SERPL-SCNC: 138 MMOL/L (ref 135–145)
TRIGL SERPL-MCNC: 82 MG/DL
TSH SERPL DL<=0.005 MIU/L-ACNC: 2.23 UIU/ML (ref 0.3–4.2)
VIT D+METAB SERPL-MCNC: 31 NG/ML (ref 20–50)

## 2024-08-23 PROCEDURE — 80053 COMPREHEN METABOLIC PANEL: CPT | Performed by: FAMILY MEDICINE

## 2024-08-23 PROCEDURE — 80061 LIPID PANEL: CPT | Performed by: FAMILY MEDICINE

## 2024-08-23 PROCEDURE — 84443 ASSAY THYROID STIM HORMONE: CPT | Performed by: FAMILY MEDICINE

## 2024-08-23 PROCEDURE — 82306 VITAMIN D 25 HYDROXY: CPT | Performed by: FAMILY MEDICINE
